# Patient Record
Sex: FEMALE | Race: WHITE | Employment: STUDENT | URBAN - METROPOLITAN AREA
[De-identification: names, ages, dates, MRNs, and addresses within clinical notes are randomized per-mention and may not be internally consistent; named-entity substitution may affect disease eponyms.]

---
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
## 2024-01-15 RX ORDER — DILTIAZEM HYDROCHLORIDE 60 MG/1
2 TABLET, FILM COATED ORAL 2 TIMES DAILY
Qty: 10.2 EACH | Refills: 0 | Status: SHIPPED | OUTPATIENT
Start: 2024-01-15

## 2024-01-15 NOTE — TELEPHONE ENCOUNTER
Last VISIT- 12-15-23 CS SOB    Last CPE- over a year ago    Last REFILL- 12-15-23    Last LABS- 12-15-23 cmp, cbc    Future Appointments   Date Time Provider Department Center   1/18/2024 10:30 AM Cesar Cui MD  [facility] [place]   1/18/2024  1:40 PM Saulo Simpson MD [facility] [address] [facility] [address]         Per PROTOCOL? No    Please Approve or Deny.

## 2024-01-18 ENCOUNTER — OFFICE VISIT (OUTPATIENT)
Dept: NEUROLOGY | Facility: CLINIC | Age: 19
End: 2024-01-18
Payer: COMMERCIAL

## 2024-01-18 ENCOUNTER — OFFICE VISIT (OUTPATIENT)
Facility: CLINIC | Age: 19
End: 2024-01-18
Payer: COMMERCIAL

## 2024-01-18 ENCOUNTER — TELEPHONE (OUTPATIENT)
Dept: INTERNAL MEDICINE CLINIC | Facility: CLINIC | Age: 19
End: 2024-01-18

## 2024-01-18 VITALS
DIASTOLIC BLOOD PRESSURE: 78 MMHG | BODY MASS INDEX: 18.96 KG/M2 | RESPIRATION RATE: 16 BRPM | OXYGEN SATURATION: 93 % | HEART RATE: 105 BPM | HEIGHT: 63 IN | WEIGHT: 107 LBS | SYSTOLIC BLOOD PRESSURE: 122 MMHG

## 2024-01-18 VITALS
DIASTOLIC BLOOD PRESSURE: 68 MMHG | SYSTOLIC BLOOD PRESSURE: 126 MMHG | RESPIRATION RATE: 16 BRPM | BODY MASS INDEX: 19 KG/M2 | HEART RATE: 107 BPM | WEIGHT: 107 LBS

## 2024-01-18 DIAGNOSIS — G43.709 CHRONIC MIGRAINE W/O AURA W/O STATUS MIGRAINOSUS, NOT INTRACTABLE: Primary | ICD-10-CM

## 2024-01-18 DIAGNOSIS — R91.8 MULTIPLE PULMONARY NODULES: ICD-10-CM

## 2024-01-18 DIAGNOSIS — R05.3 CHRONIC COUGH: ICD-10-CM

## 2024-01-18 DIAGNOSIS — R07.89 CHEST TIGHTNESS: ICD-10-CM

## 2024-01-18 DIAGNOSIS — R06.09 DOE (DYSPNEA ON EXERTION): Primary | ICD-10-CM

## 2024-01-18 PROCEDURE — 3078F DIAST BP <80 MM HG: CPT | Performed by: INTERNAL MEDICINE

## 2024-01-18 PROCEDURE — 99204 OFFICE O/P NEW MOD 45 MIN: CPT | Performed by: INTERNAL MEDICINE

## 2024-01-18 PROCEDURE — 3078F DIAST BP <80 MM HG: CPT | Performed by: OTHER

## 2024-01-18 PROCEDURE — 3074F SYST BP LT 130 MM HG: CPT | Performed by: INTERNAL MEDICINE

## 2024-01-18 PROCEDURE — 99215 OFFICE O/P EST HI 40 MIN: CPT | Performed by: OTHER

## 2024-01-18 PROCEDURE — 3074F SYST BP LT 130 MM HG: CPT | Performed by: OTHER

## 2024-01-18 PROCEDURE — 3008F BODY MASS INDEX DOCD: CPT | Performed by: INTERNAL MEDICINE

## 2024-01-18 RX ORDER — SULFAMETHOXAZOLE AND TRIMETHOPRIM 800; 160 MG/1; MG/1
1 TABLET ORAL 2 TIMES DAILY
COMMUNITY
Start: 2024-01-15 | End: 2024-01-22

## 2024-01-18 RX ORDER — PROPRANOLOL HYDROCHLORIDE 20 MG/1
20 TABLET ORAL DAILY
Qty: 30 TABLET | Refills: 3 | Status: SHIPPED | OUTPATIENT
Start: 2024-01-18

## 2024-01-18 RX ORDER — NITROFURANTOIN 25; 75 MG/1; MG/1
100 CAPSULE ORAL EVERY 12 HOURS
COMMUNITY
Start: 2023-12-31 | End: 2024-01-18

## 2024-01-18 RX ORDER — QUETIAPINE FUMARATE 50 MG/1
50 TABLET, FILM COATED ORAL NIGHTLY
COMMUNITY
Start: 2024-01-16

## 2024-01-18 RX ORDER — UBROGEPANT 50 MG/1
TABLET ORAL
Qty: 10 TABLET | Refills: 3 | Status: SHIPPED | OUTPATIENT
Start: 2024-01-18 | End: 2025-01-17

## 2024-01-18 NOTE — PATIENT INSTRUCTIONS
Obtain a breathing test (pulmonary function test and mannitol challenge) - Call central scheduling at [phone] to schedule the tests   I will plan to contact you after the tests are done  Call my office if you don't hear anything

## 2024-01-18 NOTE — PROGRESS NOTES
Pt states here for follow up on headaches and cerebral aneurysm. Pt states still having headaches and hasn't seen much improvement.

## 2024-01-18 NOTE — H&P
[facility] General Pulmonary Consult Note    History of Present Illness:  Nury Ordonez is a 18 year old female never smoker with significant PMH of childhood asthma who presents today for evaluation of dyspnea and chest pain. She reports having developed a virus around November 2023, tested negative for COVID/flu/RSV. Since that infection, she has had continued issues with nonproductive cough, dyspnea, chest pressure/tightness.  She had used albuterol MDI and neb with some transient relief, more recently was on symbicort x1 week with no improvement. She had a CT chest which noted tiny lung nodules.   She denies wheezing, f/c/s.  Does endorse ongoing evaluation with psychiatry for anxiety, possible bipolar disorder.    Past Medical History:   Past Medical History:   Diagnosis Date    Anxiety state     Depression     Migraines     Scoliosis 06/14/2019    Visual impairment         Past Surgical History: No past surgical history on file.    Family Medical History:   Family History   Problem Relation Age of Onset    Heart Disease Maternal Grandfather     High Blood Pressure Maternal Grandfather     Hypertension Maternal Grandfather     Anxiety Father     Other (Other) Father         UC    Anxiety Mother     Depression Mother     Anxiety Maternal Aunt     Bipolar Disorder Maternal Uncle     Bipolar Disorder Maternal Great-Grandmother     Cancer Paternal Grandmother         ovarian, omentum and liver        Social History:   Social History     Socioeconomic History    Marital status: Single     Spouse name: Not on file    Number of children: Not on file    Years of education: Not on file    Highest education level: Not on file   Occupational History    Not on file   Tobacco Use    Smoking status: Never    Smokeless tobacco: Never   Vaping Use    Vaping Use: Never used   Substance and Sexual Activity    Alcohol use: No    Drug use: Never    Sexual activity: Not on file   Other Topics Concern    Caffeine Concern No    Exercise  No    Seat Belt Not Asked    Special Diet Not Asked    Stress Concern Not Asked    Weight Concern Not Asked     Service Not Asked    Blood Transfusions Not Asked    Occupational Exposure Not Asked    Hobby Hazards Not Asked    Sleep Concern Not Asked    Back Care Not Asked    Bike Helmet Not Asked    Self-Exams Not Asked   Social History Narrative    Not on file     Social Determinants of Health     Financial Resource Strain: Not on file   Food Insecurity: Not on file   Transportation Needs: Not on file   Physical Activity: Not on file   Stress: Not on file   Social Connections: Not on file   Housing Stability: Not on file        Allergies: Red dye     Medications:   Current Outpatient Medications   Medication Sig Dispense Refill    QUEtiapine 50 MG Oral Tab Take 1 tablet (50 mg total) by mouth nightly.      sulfamethoxazole-trimethoprim -160 MG Oral Tab per tablet Take 1 tablet by mouth 2 (two) times daily.      albuterol (PROAIR HFA) 108 (90 Base) MCG/ACT Inhalation Aero Soln Inhale 2 puffs into the lungs every 4 (four) hours as needed for Shortness of Breath. 1 each 1    DULOXETINE 30 MG Oral Cap DR Particles TAKE 1 CAPSULE BY MOUTH EVERY DAY 90 capsule 0    SUMAtriptan 25 MG Oral Tab Take 1 tablet (25 mg total) by mouth every 2 (two) hours as needed for Migraine (no more than two tabs in 24 hours). 8 tablet 1    ubrogepant (UBRELVY) 50 MG Oral Tab Take one tablet at onset of migraine.  May take additional tablet in 2 hours if needed.  Do not exceed two tablets per 24 hour period. 10 tablet 0    Norgestimate-Eth Estradiol 0.25-35 MG-MCG Oral Tab Take 1 tablet by mouth daily. Wendy      ondansetron 4 MG Oral Tablet Dispersible Take 1 tablet (4 mg total) by mouth every 6 (six) hours as needed.      Multiple Vitamins-Minerals (MULTI-VITAMIN/MINERALS) Oral Tab Take 1 tablet by mouth daily.      traMADol 50 MG Oral Tab Take 0.5 tablets (25 mg total) by mouth 2 (two) times daily as needed for Pain. 8  tablet 0    levonorgestrel (KYLEENA) 19.5 MG Intrauterine IUD 19,500 mcg (1 each total) by Intrauterine route one time.      SYMBICORT 80-4.5 MCG/ACT Inhalation Aerosol INHALE 2 PUFFS INTO THE LUNGS 2 TIMES DAILY. RINSE MOUTH AFTER USE. (Patient not taking: Reported on 1/18/2024) 10.2 each 0    traZODone 50 MG Oral Tab Take 1 tablet (50 mg total) by mouth nightly. (Patient not taking: Reported on 1/18/2024) 90 tablet 0    methylPREDNISolone (MEDROL) 4 MG Oral Tablet Therapy Pack Take as directed (Patient not taking: Reported on 1/18/2024) 1 each 0    ALPRAZolam 0.5 MG Oral Tab Take 0.5 tablets (0.25 mg total) by mouth daily as needed for Sleep or Anxiety. (Patient not taking: Reported on 1/18/2024) 15 tablet 0    ALPRAZolam 0.25 MG Oral Tab Take 1 tablet (0.25 mg total) by mouth daily as needed. (Patient not taking: Reported on 1/18/2024) 30 tablet 0    HYDROcodone-acetaminophen 5-325 MG Oral Tab Take 1 tablet by mouth every 8 (eight) hours as needed for Pain. (Patient not taking: Reported on 1/18/2024) 9 tablet 0       Review of Systems: Review of Systems   Constitutional:  Positive for appetite change.   HENT: Negative.     Respiratory:  Positive for cough, chest tightness and shortness of breath. Negative for choking, wheezing and stridor.    Cardiovascular:  Positive for chest pain and palpitations.       Physical Exam:  /78 (BP Location: Left arm, Patient Position: Sitting, Cuff Size: adult)   Pulse 105   Resp 16   Ht 5' 3\" (1.6 m)   Wt 107 lb (48.5 kg)   LMP 08/20/2023 (Exact Date)   SpO2 93%   BMI 18.95 kg/m²      Constitutional: alert, cooperative. No acute distress.  HEENT: Head NC/AT.   Cardio: Regular rate and rhythm. Normal S1 and S2. No murmurs.   Respiratory: Thorax symmetrical with no labored breathing. Clear to ausculation bilaterally with symmetrical breath sounds. No wheezing, rhonchi, rales, or crackles.   GI: NABS. Abd soft, non-tender.  Extremities: No clubbing or cyanosis. No BLE  edema.    Neurologic: A&Ox3. No gross motor deficits.  Skin: Warm, dry  Psych: Calm, cooperative. Pleasant affect.    Results:  Personally reviewed  WBC: 7.2, done on 12/15/2023.  HGB: 13.7, done on 12/15/2023.  PLT: 252, done on 12/15/2023.     Glucose: 79, done on 12/15/2023.  Cr: 0.69, done on 12/15/2023.  CA: 8.6, done on 12/15/2023.  Na: 140, done on 12/15/2023.  K: 3.6, done on 12/15/2023.  Cl: 109, done on 12/15/2023.  CO2: 26, done on 12/15/2023.  Last ALB was 3.6% done on 12/15/2023.     CT CHEST (CPT=71250)    Result Date: 12/29/2023  CONCLUSION:  No focal consolidation is seen.  The lungs appear essentially clear.  There are tiny micro nodules likely present measuring up to 4 mm. According to Fleischner guidelines, solid nodules measuring less than 6 mm require no further followup in  low risk patients. In high-risk patients with a history of smoking or other risk factors, consider CT at 12 months.   LOCATION:  [id number]   Dictated by (CST): Chato Uribe MD on 12/29/2023 at 2:54 PM     Finalized by (CST): Chato Uribe MD on 12/29/2023 at 2:57 PM       MRI BRAIN MRA HEAD+MRA NECK (ALL W+WO) (CPT=70553/76993/35528)    Result Date: 12/28/2023  CONCLUSION:  Normal examination.   LOCATION:  [id number]   Dictated by (CST): Kumar Mejía MD on 12/28/2023 at 2:18 PM     Finalized by (CST): Kumar Mejía MD on 12/28/2023 at 2:28 PM       XR CHEST PA + LAT CHEST (CPT=71046)    Result Date: 12/15/2023  CONCLUSION:  No focal consolidation, but a reticular nodular pattern in the lung along with bronchial wall thickening may reflect atypical inflammatory disease or other interstitial lung disease process.  Consider high-resolution chest CT.  LOCATION:  [facility]   Dictated by (CST): Kumar Mejía MD on 12/15/2023 at 8:48 AM     Finalized by (CST): Kumar Mejía MD on 12/15/2023 at 8:50 AM         Assessment/Plan:  #1. dyspnea  Unclear etiology at this time. Main ddx between asthma vs anxiety vs other  ?hyperythyroidism  12/2023 CT chest essentially normal except for 2 and 4mm nodules which are unconcerning  Check PFTs/mannitol challenge  Continue on albuterol PRN; reports no improvement with symbicort 80 (but only for one week)  If negative PFTs, then consider cardiac workup vs further treatment with psychiatry    #2. Chronic cough  As above    #3. Chest tightness  As above    #4. Lung nodules  12/2023 CT chest essentially normal except for 2 and 4mm nodules which are unconcerning  No need for further testing per Fleischner guidelines.    Cesar Cui MD  1/18/2024

## 2024-01-18 NOTE — PROGRESS NOTES
[facility] Neurology Outpatient Progress Note  Date of service: 1/18/2024    Assessment:   Chronic migraine; not improving    Plan:  Reviewed  MRI BRAIN MRA HEAD+MRA NECK , negative  NO Imitrex due to chest pain  Ubrelvy prn  Propranolol started  Headache diary advised  Migraine and Medication overuse headache education given  PCP and Psychiatrist to follow re; anxiety  See orders and medications filed with this encounter. The patient indicates understanding of these issues and agrees with the plan.  Discussed with patient regarding assessment, work up, care plan and possible adverse and side effects of the medications.  RTC 6 months, needs update in 2 months other options; qulipta, amovig  Pt should go ER for any new or worsening symptoms and contact office for above tests' results, any    Subjective:   History:  Patient here for a follow-up visit for worsening daily headache. Since last visit headache has not improved, reports chest pain and is to see cardio.   Here to review test result and care plan, mother is here too  Per initial visit:  Nury Ordonez is a 18 year old female with past medical history as listed below presents here for initial evaluation of worsening headache, she states having headaches and migraines. Pt reports different type headaches, currently feels band around frontal area which is dfferent from typical migraine, her migraine starts in left eye, this persistent headache has started since early October and has not resolved, she tried and failed many OTC pain meds and norco, +family history of aneurysm, her grandfather had cerebral aneurysm.       History/Other:   REVIEW OF SYSTEMS:  A 10-point system was reviewed. Pertinent positives and negatives are noted as above       Current Outpatient Medications:     QUEtiapine 50 MG Oral Tab, Take 1 tablet (50 mg total) by mouth nightly., Disp: , Rfl:     sulfamethoxazole-trimethoprim -160 MG Oral Tab per tablet, Take 1 tablet by mouth 2 (two)  times daily., Disp: , Rfl:     propranolol 20 MG Oral Tab, Take 1 tablet (20 mg total) by mouth daily., Disp: 30 tablet, Rfl: 3    ubrogepant (UBRELVY) 50 MG Oral Tab, Take one tablet at onset of migraine.  May take additional tablet in 2 hours if needed.  Do not exceed two tablets per 24 hour period., Disp: 10 tablet, Rfl: 3    albuterol (PROAIR HFA) 108 (90 Base) MCG/ACT Inhalation Aero Soln, Inhale 2 puffs into the lungs every 4 (four) hours as needed for Shortness of Breath., Disp: 1 each, Rfl: 1    DULOXETINE 30 MG Oral Cap DR Particles, TAKE 1 CAPSULE BY MOUTH EVERY DAY, Disp: 90 capsule, Rfl: 0    Norgestimate-Eth Estradiol 0.25-35 MG-MCG Oral Tab, Take 1 tablet by mouth daily. Wendy, Disp: , Rfl:     ondansetron 4 MG Oral Tablet Dispersible, Take 1 tablet (4 mg total) by mouth every 6 (six) hours as needed., Disp: , Rfl:     Multiple Vitamins-Minerals (MULTI-VITAMIN/MINERALS) Oral Tab, Take 1 tablet by mouth daily., Disp: , Rfl:     traMADol 50 MG Oral Tab, Take 0.5 tablets (25 mg total) by mouth 2 (two) times daily as needed for Pain., Disp: 8 tablet, Rfl: 0    levonorgestrel (KYLEENA) 19.5 MG Intrauterine IUD, 19,500 mcg (1 each total) by Intrauterine route one time., Disp: , Rfl:     SYMBICORT 80-4.5 MCG/ACT Inhalation Aerosol, INHALE 2 PUFFS INTO THE LUNGS 2 TIMES DAILY. RINSE MOUTH AFTER USE. (Patient not taking: Reported on 1/18/2024), Disp: 10.2 each, Rfl: 0  Allergies:  Allergies   Allergen Reactions    Red Dye HIVES and NAUSEA ONLY     Past Medical History:   Diagnosis Date    Anxiety state     Depression     Migraines     Scoliosis 06/14/2019    Visual impairment      History reviewed. No pertinent surgical history.  Social History:  Social History     Tobacco Use    Smoking status: Never    Smokeless tobacco: Never   Substance Use Topics    Alcohol use: No     Family History   Problem Relation Age of Onset    Heart Disease Maternal Grandfather     High Blood Pressure Maternal Grandfather      Hypertension Maternal Grandfather     Anxiety Father     Other (Other) Father         UC    Anxiety Mother     Depression Mother     Anxiety Maternal Aunt     Bipolar Disorder Maternal Uncle     Bipolar Disorder Maternal Great-Grandmother     Cancer Paternal Grandmother         ovarian, omentum and liver       Objective:   Neurological Examination:  /68 (BP Location: Left arm, Patient Position: Sitting, Cuff Size: adult)   Pulse 107   Resp 16   Wt 107 lb (48.5 kg)   LMP 08/20/2023 (Exact Date)   BMI 18.95 kg/m²   Mental status: A & O X 3  Language: no aphasia  Speech: no dysarthria  CN II-XII: intact   Motor strength: 5/5 all extremities  Tone: normal  DTRs: 2+ symmetric  Coordination: normal  Sensory: symmetric  Gait: normal    Test reviewed on 1/18/2024      Saulo Simpson MD (Michael)  Neurology  [facility]  1/18/2024, 1:53 PM  CC: Basilio Abdalla MD

## 2024-01-18 NOTE — PATIENT INSTRUCTIONS
Refill policies:    Allow 2-3 business days for refills; controlled substances may take longer.  Contact your pharmacy at least 5 days prior to running out of medication and have them send an electronic request or submit request through the “request refill” option in your [employer] account.  Refills are not addressed on weekends; covering physicians do not authorize routine medications on weekends.  No narcotics or controlled substances are refilled after noon on Fridays or by on call physicians.  By law, narcotics must be electronically prescribed.  A 30 day supply with no refills is the maximum allowed.  If your prescription is due for a refill, you may be due for a follow up appointment.  To best provide you care, patients receiving routine medications need to be seen at least once a year.  Patients receiving narcotic/controlled substance medications need to be seen at least once every 3 months.  In the event that your preferred pharmacy does not have the requested medication in stock (e.g. Backordered), it is your responsibility to find another pharmacy that has the requested medication available.  We will gladly send a new prescription to that pharmacy at your request.    Scheduling Tests:    If your physician has ordered radiology tests such as MRI or CT scans, please contact Central Scheduling at [phone] right away to schedule the test.  Once scheduled, the [facility] Centralized Referral Team will work with your insurance carrier to obtain pre-certification or prior authorization.  Depending on your insurance carrier, approval may take 3-10 days.  It is highly recommended patients assure they have received an authorization before having a test performed.  If test is done without insurance authorization, patient may be responsible for the entire amount billed.      Precertification and Prior Authorizations:  If your physician has recommended that you have a procedure or additional testing performed the [facility]  Centralized Referral Team will contact your insurance carrier to obtain pre-certification or prior authorization.    You are strongly encouraged to contact your insurance carrier to verify that your procedure/test has been approved and is a COVERED benefit.  Although the [facility] Centralized Referral Team does its due diligence, the insurance carrier gives the disclaimer that \"Although the procedure is authorized, this does not guarantee payment.\"    Ultimately the patient is responsible for payment.   Thank you for your understanding in this matter.  Paperwork Completion:  If you require FMLA or disability paperwork for your recovery, please make sure to either drop it off or have it faxed to our office at [phone]. Be sure the form has your name and date of birth on it.  The form will be faxed to our Forms Department and they will complete it for you.  There is a 25$ fee for all forms that need to be filled out.  Please be aware there is a 10-14 day turnaround time.  You will need to sign a release of information (RACHAEL) form if your paperwork does not come with one.  You may call the Forms Department with any questions at [phone].  Their fax number is [phone].

## 2024-01-22 ENCOUNTER — TELEPHONE (OUTPATIENT)
Dept: INTERNAL MEDICINE CLINIC | Facility: CLINIC | Age: 19
End: 2024-01-22

## 2024-01-23 NOTE — TELEPHONE ENCOUNTER
Recommend pt call insurance for list of covered alternatives. Options include dulera, advair, breo, etc.

## 2024-02-01 ENCOUNTER — OFFICE VISIT (OUTPATIENT)
Dept: INTERNAL MEDICINE CLINIC | Facility: CLINIC | Age: 19
End: 2024-02-01
Payer: COMMERCIAL

## 2024-02-01 VITALS
WEIGHT: 111.63 LBS | HEIGHT: 64 IN | SYSTOLIC BLOOD PRESSURE: 102 MMHG | DIASTOLIC BLOOD PRESSURE: 64 MMHG | BODY MASS INDEX: 19.06 KG/M2 | RESPIRATION RATE: 16 BRPM | TEMPERATURE: 99 F | OXYGEN SATURATION: 99 % | HEART RATE: 103 BPM

## 2024-02-01 DIAGNOSIS — F41.0 PANIC ANXIETY SYNDROME: ICD-10-CM

## 2024-02-01 DIAGNOSIS — M25.50 DIFFUSE ARTHRALGIA: Primary | ICD-10-CM

## 2024-02-01 DIAGNOSIS — F31.60 BIPOLAR AFFECTIVE DISORDER, CURRENT EPISODE MIXED, CURRENT EPISODE SEVERITY UNSPECIFIED (HCC): ICD-10-CM

## 2024-02-01 DIAGNOSIS — R63.0 LOSS OF APPETITE: ICD-10-CM

## 2024-02-01 PROCEDURE — 3008F BODY MASS INDEX DOCD: CPT | Performed by: INTERNAL MEDICINE

## 2024-02-01 PROCEDURE — 99215 OFFICE O/P EST HI 40 MIN: CPT | Performed by: INTERNAL MEDICINE

## 2024-02-01 PROCEDURE — 3074F SYST BP LT 130 MM HG: CPT | Performed by: INTERNAL MEDICINE

## 2024-02-01 PROCEDURE — 3078F DIAST BP <80 MM HG: CPT | Performed by: INTERNAL MEDICINE

## 2024-02-01 RX ORDER — DIAZEPAM 5 MG/1
TABLET ORAL
COMMUNITY
Start: 2024-01-18

## 2024-02-01 RX ORDER — FAMOTIDINE 40 MG/1
40 TABLET, FILM COATED ORAL DAILY
Qty: 30 TABLET | Refills: 0 | Status: SHIPPED | OUTPATIENT
Start: 2024-02-01

## 2024-02-01 RX ORDER — GABAPENTIN 100 MG/1
100 CAPSULE ORAL NIGHTLY PRN
Qty: 30 CAPSULE | Refills: 0 | Status: SHIPPED | OUTPATIENT
Start: 2024-02-01

## 2024-02-01 NOTE — PROGRESS NOTES
Nury Ordonez  9/1/2005    Chief Complaint   Patient presents with    Follow - Up     Rm 7        SUBJECTIVE   Nury Ordonez is a 18 year old female who presents as a follow-up.    The patient recently underwent ED evaluation for abdominal pain and mood changes. Documentation of this encounter is not available at this time. She was managed for acute cystitis and underwent evaluation by the psychiatry service as an outpatient the following day. She was found to have bipolar disorder with fabian and depressive symptoms. She has since been following with the psychiatry service twice weekly and medical management has been updated, including tapering and ultimate discontinuation of cymbalta. Resultantly, her symptoms of diffuse arthralgia and myalgias returned. Furthermore, although mood significantly improved over the last two weeks, she reports a loss of appetite with minimal nausea. No significant abdominal pain. Yesterday her mood worsened, however, she ate a significant meal. Bowel movements has remained stable. She underwent TSH and lipid panel evaluation: TSH was WNL, and lipid panel revealed an elevated tg level, however, nonfasting.     Review of Systems   No f/c/chest pain or sob. No cough. No abd pain/n/v/d. No ha or dizziness. No numbness, tingling, or weakness. No other complaints today.    Current Outpatient Medications   Medication Sig Dispense Refill    diazePAM 5 MG Oral Tab TAKE ONE TAB BY MOUTH 3 TIMES A DAY AS NEEDED FOR ANXIETY OR INSOMNIA      gabapentin 100 MG Oral Cap Take 1 capsule (100 mg total) by mouth nightly as needed. 30 capsule 0    famotidine (PEPCID) 40 MG Oral Tab Take 1 tablet (40 mg total) by mouth daily. 30 tablet 0    QUEtiapine 50 MG Oral Tab Take 1 tablet (50 mg total) by mouth nightly. 50-75mg at night      propranolol 20 MG Oral Tab Take 1 tablet (20 mg total) by mouth daily. 30 tablet 3    ubrogepant (UBRELVY) 50 MG Oral Tab Take one tablet at onset of migraine.   May take additional tablet in 2 hours if needed.  Do not exceed two tablets per 24 hour period. 10 tablet 3    albuterol (PROAIR HFA) 108 (90 Base) MCG/ACT Inhalation Aero Soln Inhale 2 puffs into the lungs every 4 (four) hours as needed for Shortness of Breath. 1 each 1    Norgestimate-Eth Estradiol 0.25-35 MG-MCG Oral Tab Take 1 tablet by mouth daily. Wendy      ondansetron 4 MG Oral Tablet Dispersible Take 1 tablet (4 mg total) by mouth every 6 (six) hours as needed.      Multiple Vitamins-Minerals (MULTI-VITAMIN/MINERALS) Oral Tab Take 1 tablet by mouth daily.      levonorgestrel (KYLEENA) 19.5 MG Intrauterine IUD 19,500 mcg (1 each total) by Intrauterine route one time.      SYMBICORT 80-4.5 MCG/ACT Inhalation Aerosol INHALE 2 PUFFS INTO THE LUNGS 2 TIMES DAILY. RINSE MOUTH AFTER USE. (Patient not taking: Reported on 1/18/2024) 10.2 each 0    DULOXETINE 30 MG Oral Cap DR Particles TAKE 1 CAPSULE BY MOUTH EVERY DAY (Patient not taking: Reported on 2/1/2024) 90 capsule 0    traMADol 50 MG Oral Tab Take 0.5 tablets (25 mg total) by mouth 2 (two) times daily as needed for Pain. (Patient not taking: Reported on 2/1/2024) 8 tablet 0      Allergies   Allergen Reactions    Red Dye HIVES and NAUSEA ONLY      Past Medical History:   Diagnosis Date    Anxiety state     Depression     Migraines     Scoliosis 06/14/2019    Visual impairment       Patient Active Problem List   Diagnosis    DAO (generalized anxiety disorder)    Depression, unspecified depression type    Scoliosis    Chronic constipation    Menorrhagia with regular cycle    Chronic bilateral thoracic back pain    Panic anxiety syndrome    Dysmenorrhea    Chronic migraine w/o aura w/o status migrainosus, not intractable      History reviewed. No pertinent surgical history.   Social History     Socioeconomic History    Marital status: Single   Tobacco Use    Smoking status: Never    Smokeless tobacco: Never   Vaping Use    Vaping Use: Never used   Substance and  Sexual Activity    Alcohol use: No    Drug use: Never   Other Topics Concern    Caffeine Concern No    Exercise No         OBJECTIVE:   /64 (BP Location: Left arm, Patient Position: Sitting, Cuff Size: adult)   Pulse 103   Temp 98.8 °F (37.1 °C) (Skin)   Resp 16   Ht 5' 4\" (1.626 m)   Wt 111 lb 9.6 oz (50.6 kg)   LMP 08/20/2023 (Exact Date)   SpO2 99%   BMI 19.16 kg/m²   Constitutional: Oriented to person, place, and time. No distress.   HEENT:  Normocephalic and atraumatic.  Cardiovascular: Normal rate, regular rhythm and intact distal pulses.  No murmur, rubs or gallops.   Pulmonary/Chest: Effort normal and breath sounds normal. No respiratory distress.  Abdominal: Soft and nondistended. Epigastric tenderness without guarding or rebound.   Musculoskeletal: No edema  Skin: Skin is warm and dry. No rash.  Psychiatric: Normal mood and flat affect.     ASSESSMENT AND PLAN:   Nury Ordonez is a 18 year old female who presents as a follow-up.    Bipolar disorder and panic anxiety syndrome:  Varying symptoms of depression and fabian  Following with psychiatry service twice weekly  I do not believe her appetite changes are secondary to an anatomic or strictly physiologic etiology: lipase and CMP added. TSH WNL.  Chronic diffuse myalgias and arthralgias: refractory symptoms following discontinuation of cymbalta. Encouraged physical activity to manage symptoms of fibromyalgia. Gabapentin offered (reports red dye allergy is mild and insignificant). Autoimmune and rheumatologic panels ordered.     Total time spent: 45 minutes    The patient indicates understanding of these issues and agrees to the plan.  TODAY'S ORDERS     Orders Placed This Encounter   Procedures    Comp Metabolic Panel (14)    Lipase [E]    Rheumatoid Arthritis Factor    Uric Acid, Serum    Cyclic Citrullinate Peptide (CCP) antibodies    Sed Rate, Westergren (Automated)    C-Reactive Protein    Maday, Direct, Reflex To [facility]  (Cesar/[place])       Meds & Refills:  Requested Prescriptions     Signed Prescriptions Disp Refills    gabapentin 100 MG Oral Cap 30 capsule 0     Sig: Take 1 capsule (100 mg total) by mouth nightly as needed.    famotidine (PEPCID) 40 MG Oral Tab 30 tablet 0     Sig: Take 1 tablet (40 mg total) by mouth daily.       Imaging & Consults:  None    No follow-ups on file.  There are no Patient Instructions on file for this visit.    All questions were answered and the patient agrees with the plan.     Thank you,  Basilio Abdalla MD

## 2024-02-06 ENCOUNTER — LAB ENCOUNTER (OUTPATIENT)
Dept: LAB | Facility: HOSPITAL | Age: 19
End: 2024-02-06
Attending: INTERNAL MEDICINE
Payer: COMMERCIAL

## 2024-02-06 DIAGNOSIS — M25.50 DIFFUSE ARTHRALGIA: ICD-10-CM

## 2024-02-06 DIAGNOSIS — Z79.899 NEED FOR PROPHYLACTIC CHEMOTHERAPY: Primary | ICD-10-CM

## 2024-02-06 LAB
CHOLEST SERPL-MCNC: 174 MG/DL (ref ?–200)
ERYTHROCYTE [SEDIMENTATION RATE] IN BLOOD: 12 MM/HR
FASTING PATIENT LIPID ANSWER: YES
HDLC SERPL-MCNC: 61 MG/DL (ref 40–59)
LDLC SERPL CALC-MCNC: 84 MG/DL (ref ?–100)
NONHDLC SERPL-MCNC: 113 MG/DL (ref ?–130)
RHEUMATOID FACT SERPL-ACNC: <10 IU/ML (ref ?–15)
TRIGL SERPL-MCNC: 171 MG/DL (ref 30–149)
VLDLC SERPL CALC-MCNC: 27 MG/DL (ref 0–30)

## 2024-02-06 PROCEDURE — 86200 CCP ANTIBODY: CPT

## 2024-02-06 PROCEDURE — 85652 RBC SED RATE AUTOMATED: CPT

## 2024-02-06 PROCEDURE — 80061 LIPID PANEL: CPT

## 2024-02-06 PROCEDURE — 36415 COLL VENOUS BLD VENIPUNCTURE: CPT

## 2024-02-06 PROCEDURE — 86431 RHEUMATOID FACTOR QUANT: CPT

## 2024-02-07 ENCOUNTER — TELEPHONE (OUTPATIENT)
Dept: INTERNAL MEDICINE CLINIC | Facility: CLINIC | Age: 19
End: 2024-02-07

## 2024-02-07 LAB — CCP IGG SERPL-ACNC: 1.1 U/ML (ref 0–6.9)

## 2024-02-07 NOTE — TELEPHONE ENCOUNTER
Jolynn from [employer] - has a question on the below lab order.    Maday, Direct, Reflex To [facility] ([facility]) (Order [id number])

## 2024-02-07 NOTE — TELEPHONE ENCOUNTER
Spoke with gabriela from [facility]. She indicates patient at there site for testing, testing ordered to [facility] for send out.     [facility] does not have this test to include the 9 biomarkers and missing 4 of them. Advised patient will need to complete at [facility] given not available at [facility].     She will inform patient and have patient call us if any further questions, if not will pursue testing at [facility]. For daysi direct, relfex to [facility].

## 2024-02-13 ENCOUNTER — LAB ENCOUNTER (OUTPATIENT)
Dept: LAB | Facility: HOSPITAL | Age: 19
End: 2024-02-13
Attending: INTERNAL MEDICINE
Payer: COMMERCIAL

## 2024-02-13 DIAGNOSIS — M25.50 DIFFUSE ARTHRALGIA: Primary | ICD-10-CM

## 2024-02-13 PROCEDURE — 86038 ANTINUCLEAR ANTIBODIES: CPT

## 2024-02-13 PROCEDURE — 36415 COLL VENOUS BLD VENIPUNCTURE: CPT

## 2024-02-13 PROCEDURE — 86225 DNA ANTIBODY NATIVE: CPT

## 2024-02-14 LAB
DSDNA IGG SERPL IA-ACNC: 3.2 IU/ML
ENA AB SER QL IA: 0.3 UG/L
ENA AB SER QL IA: NEGATIVE

## 2024-02-21 ENCOUNTER — TELEPHONE (OUTPATIENT)
Facility: CLINIC | Age: 19
End: 2024-02-21

## 2024-02-29 RX ORDER — FAMOTIDINE 40 MG/1
40 TABLET, FILM COATED ORAL DAILY
Qty: 90 TABLET | Refills: 0 | Status: SHIPPED | OUTPATIENT
Start: 2024-02-29

## 2024-02-29 NOTE — TELEPHONE ENCOUNTER
famotidine (PEPCID) 40 MG Oral Tab         Sig: Take 1 tablet (40 mg total) by mouth daily.    Disp: 30 tablet    Refills: 0    Start: 2/29/2024    Class: Normal    Last ordered: 4 weeks ago (2/1/2024) by Basilio Abdalla MD    Gastrointestional Medication Protocol [id number]/29/2024 01:19 PM   Protocol Details In person appointment or virtual visit in the past 12 mos or appointment in next 3 mos      To be filled at: [facility]/pharmacy #[id number] [address] [phone], [phone]          #30 was sent on 2/1/24. AD, pt to continue? Pended if agreeable. Upcoming appt 3/11

## 2024-02-29 NOTE — TELEPHONE ENCOUNTER
famotidine (PEPCID) 40 MG Oral Tab         Sig: Take 1 tablet (40 mg total) by mouth daily.    Disp: 30 tablet    Refills: 0    Start: 2/29/2024    Class: Normal    Last ordered: 4 weeks ago (2/1/2024) by Basilio Abdalla MD    Gastrointestional Medication Protocol [id number]/29/2024 01:19 PM   Protocol Details In person appointment or virtual visit in the past 12 mos or appointment in next 3 mos      To be filled at: [facility]/pharmacy #[id number] - [address] [phone], [phone]          LOV 2/1/24. Upcoming appt 3/11/24.

## 2024-03-11 ENCOUNTER — OFFICE VISIT (OUTPATIENT)
Dept: INTERNAL MEDICINE CLINIC | Facility: CLINIC | Age: 19
End: 2024-03-11
Payer: COMMERCIAL

## 2024-03-11 VITALS
HEART RATE: 79 BPM | RESPIRATION RATE: 16 BRPM | OXYGEN SATURATION: 100 % | TEMPERATURE: 98 F | DIASTOLIC BLOOD PRESSURE: 68 MMHG | SYSTOLIC BLOOD PRESSURE: 102 MMHG

## 2024-03-11 DIAGNOSIS — Z00.00 ROUTINE GENERAL MEDICAL EXAMINATION AT A HEALTH CARE FACILITY: Primary | ICD-10-CM

## 2024-03-11 DIAGNOSIS — F41.0 PANIC ANXIETY SYNDROME: ICD-10-CM

## 2024-03-11 DIAGNOSIS — M41.25 OTHER IDIOPATHIC SCOLIOSIS, THORACOLUMBAR REGION: ICD-10-CM

## 2024-03-11 DIAGNOSIS — F32.A DEPRESSION, UNSPECIFIED DEPRESSION TYPE: ICD-10-CM

## 2024-03-11 DIAGNOSIS — F41.1 GAD (GENERALIZED ANXIETY DISORDER): ICD-10-CM

## 2024-03-11 PROBLEM — N92.0 MENORRHAGIA WITH REGULAR CYCLE: Status: RESOLVED | Noted: 2022-10-03 | Resolved: 2024-03-11

## 2024-03-11 PROBLEM — K59.09 CHRONIC CONSTIPATION: Status: RESOLVED | Noted: 2021-12-07 | Resolved: 2024-03-11

## 2024-03-11 PROBLEM — N94.6 DYSMENORRHEA: Status: RESOLVED | Noted: 2023-09-26 | Resolved: 2024-03-11

## 2024-03-11 PROBLEM — G43.709 CHRONIC MIGRAINE W/O AURA W/O STATUS MIGRAINOSUS, NOT INTRACTABLE: Status: RESOLVED | Noted: 2023-11-01 | Resolved: 2024-03-11

## 2024-03-11 PROBLEM — M54.6 CHRONIC BILATERAL THORACIC BACK PAIN: Status: RESOLVED | Noted: 2022-10-03 | Resolved: 2024-03-11

## 2024-03-11 PROBLEM — G89.29 CHRONIC BILATERAL THORACIC BACK PAIN: Status: RESOLVED | Noted: 2022-10-03 | Resolved: 2024-03-11

## 2024-03-11 PROCEDURE — 99395 PREV VISIT EST AGE 18-39: CPT | Performed by: INTERNAL MEDICINE

## 2024-03-11 PROCEDURE — 3078F DIAST BP <80 MM HG: CPT | Performed by: INTERNAL MEDICINE

## 2024-03-11 PROCEDURE — 3074F SYST BP LT 130 MM HG: CPT | Performed by: INTERNAL MEDICINE

## 2024-03-11 RX ORDER — LAMOTRIGINE 25 MG/1
25 TABLET ORAL 2 TIMES DAILY
COMMUNITY
Start: 2024-03-06

## 2024-03-11 RX ORDER — DEXTROAMPHETAMINE SACCHARATE, AMPHETAMINE ASPARTATE MONOHYDRATE, DEXTROAMPHETAMINE SULFATE AND AMPHETAMINE SULFATE 2.5; 2.5; 2.5; 2.5 MG/1; MG/1; MG/1; MG/1
10 CAPSULE, EXTENDED RELEASE ORAL EVERY MORNING
COMMUNITY
Start: 2024-02-26

## 2024-03-11 NOTE — PROGRESS NOTES
Nury Odronez  9/1/2005    Chief Complaint   Patient presents with    Follow - Up     Rm 7        HPI:   Nury Ordonez is a 18 year old female who presents for an annual physical examination.      Since last evaluation the patient reports significant improvement in symptoms of anxiety and depression, and difficulty with focusing suggestive of ADHD.  She has been following with an alternate psychiatrist, and there have been since several medication changes offering significant improvement.  She also reports improved symptoms of chronic pain secondary to resuming Cymbalta.  She denies any acute concerns at this time.    Current Outpatient Medications   Medication Sig Dispense Refill    lamoTRIgine 25 MG Oral Tab Take 1 tablet (25 mg total) by mouth 2 (two) times daily.      amphetamine-dextroamphetamine ER 10 MG Oral Capsule SR 24 Hr Take 1 capsule (10 mg total) by mouth every morning.      famotidine (PEPCID) 40 MG Oral Tab Take 1 tablet (40 mg total) by mouth daily. 90 tablet 0    diazePAM 5 MG Oral Tab TAKE ONE TAB BY MOUTH 3 TIMES A DAY AS NEEDED FOR ANXIETY OR INSOMNIA      propranolol 20 MG Oral Tab Take 1 tablet (20 mg total) by mouth daily. 30 tablet 3    ubrogepant (UBRELVY) 50 MG Oral Tab Take one tablet at onset of migraine.  May take additional tablet in 2 hours if needed.  Do not exceed two tablets per 24 hour period. 10 tablet 3    albuterol (PROAIR HFA) 108 (90 Base) MCG/ACT Inhalation Aero Soln Inhale 2 puffs into the lungs every 4 (four) hours as needed for Shortness of Breath. 1 each 1    DULOXETINE 30 MG Oral Cap DR Particles TAKE 1 CAPSULE BY MOUTH EVERY DAY 90 capsule 0    Norgestimate-Eth Estradiol 0.25-35 MG-MCG Oral Tab Take 1 tablet by mouth daily. Wendy      ondansetron 4 MG Oral Tablet Dispersible Take 1 tablet (4 mg total) by mouth every 6 (six) hours as needed.      Multiple Vitamins-Minerals (MULTI-VITAMIN/MINERALS) Oral Tab Take 1 tablet by mouth daily.      levonorgestrel  (KYLEENA) 19.5 MG Intrauterine IUD 19,500 mcg (1 each total) by Intrauterine route one time.      gabapentin 100 MG Oral Cap Take 1 capsule (100 mg total) by mouth nightly as needed. 30 capsule 0    QUEtiapine 50 MG Oral Tab Take 1 tablet (50 mg total) by mouth nightly. 50-75mg at night      SYMBICORT 80-4.5 MCG/ACT Inhalation Aerosol INHALE 2 PUFFS INTO THE LUNGS 2 TIMES DAILY. RINSE MOUTH AFTER USE. (Patient not taking: Reported on 1/18/2024) 10.2 each 0    traMADol 50 MG Oral Tab Take 0.5 tablets (25 mg total) by mouth 2 (two) times daily as needed for Pain. (Patient not taking: Reported on 2/1/2024) 8 tablet 0      Allergies   Allergen Reactions    Red Dye HIVES and NAUSEA ONLY      Past Medical History:   Diagnosis Date    Anxiety state     Depression     Migraines     Scoliosis 06/14/2019    Visual impairment       Patient Active Problem List   Diagnosis    DAO (generalized anxiety disorder)    Depression, unspecified depression type    Scoliosis    Chronic constipation    Menorrhagia with regular cycle    Chronic bilateral thoracic back pain    Panic anxiety syndrome    Dysmenorrhea    Chronic migraine w/o aura w/o status migrainosus, not intractable      No past surgical history on file.   Family History   Problem Relation Age of Onset    Heart Disease Maternal Grandfather     High Blood Pressure Maternal Grandfather     Hypertension Maternal Grandfather     Anxiety Father     Other (Other) Father         UC    Anxiety Mother     Depression Mother     Anxiety Maternal Aunt     Bipolar Disorder Maternal Uncle     Bipolar Disorder Maternal Great-Grandmother     Cancer Paternal Grandmother         ovarian, omentum and liver      Social History     Socioeconomic History    Marital status: Single   Tobacco Use    Smoking status: Never    Smokeless tobacco: Never   Vaping Use    Vaping Use: Never used   Substance and Sexual Activity    Alcohol use: No    Drug use: Never   Other Topics Concern    Caffeine Concern  No    Exercise No         REVIEW OF SYSTEMS:   GENERAL: feels well otherwise  SKIN: no rashes  EYES:denies blurred vision or double vision  HEENT: Not congested  LUNGS: denies shortness of breath with exertion  CARDIOVASCULAR: denies chest pain on exertion  GI: no nausea or abdominal pain  NEURO: denies headaches    EXAM:   /68   Pulse 79   Temp 98.2 °F (36.8 °C) (Skin)   Resp 16   LMP 08/20/2023 (Exact Date)   SpO2 100%   GENERAL: Well developed, well nourished,in no apparent distress  SKIN: No rashes,no suspicious lesions  EYES: Bilateral conjunctiva are clear  HEENT: atraumatic, normocephalic. TM WNL BL.  NECK: supple,no adenopathy,no bruits  LUNGS: clear to auscultation  CARDIO: RRR without murmur  GI: good BS's,no masses, HSM or tenderness    ASSESSMENT AND PLAN:   Nury Ordonez is a 18 year old female who presents for an annual physical examination.    Outstanding screening and preventive measures:  None    Anxiety and depression:  Panic anxiety syndrome and generalized anxiety disorder  Improved control following medication changes associated chronic pain syndrome suggestive of fibromyalgia    Scoliosis of thoracolumbar region:  Improved symptoms with improving frequency and duration of ambulation, and Cymbalta use    The patient indicates understanding of these issues and agrees to the plan.  TODAY'S ORDERS     No orders of the defined types were placed in this encounter.      Meds & Refills:  Requested Prescriptions      No prescriptions requested or ordered in this encounter       Imaging & Consults:  None    No follow-ups on file.  There are no Patient Instructions on file for this visit.    All questions were answered and the patient agrees with the plan.     Thank you,  Basilio Abdalla MD

## 2024-04-14 DIAGNOSIS — G43.709 CHRONIC MIGRAINE W/O AURA W/O STATUS MIGRAINOSUS, NOT INTRACTABLE: Primary | ICD-10-CM

## 2024-04-15 RX ORDER — PROPRANOLOL HYDROCHLORIDE 20 MG/1
20 TABLET ORAL DAILY
Qty: 90 TABLET | Refills: 0 | Status: SHIPPED | OUTPATIENT
Start: 2024-04-15

## 2024-04-15 NOTE — TELEPHONE ENCOUNTER
Medication: PROPRANOLOL 20 MG Oral Tab      Date of last refill: 01/18/2024 (#30/3)  [place]     Last office visit: 01/18/2024  Due back to clinic per last office note:  not stated  Date next office visit scheduled:    No future appointments.        Last OV note recommendation:    Plan:  Reviewed  MRI BRAIN MRA HEAD+MRA NECK , negative  NO Imitrex due to chest pain  Ubrelvy prn  Propranolol started  Headache diary advised  Migraine and Medication overuse headache education given  PCP and Psychiatrist to follow re; anxiety  See orders and medications filed with this encounter. The patient indicates understanding of these issues and agrees with the plan.  Discussed with patient regarding assessment, work up, care plan and possible adverse and side effects of the medications.  RTC 6 months, needs update in 2 months other options; qulipta, amovig  Pt should go ER for any new or worsening symptoms and contact office for above tests' results, any

## 2024-05-28 ENCOUNTER — RT VISIT (OUTPATIENT)
Dept: RESPIRATORY THERAPY | Facility: HOSPITAL | Age: 19
End: 2024-05-28
Attending: INTERNAL MEDICINE

## 2024-05-28 DIAGNOSIS — R06.09 DOE (DYSPNEA ON EXERTION): ICD-10-CM

## 2024-05-30 PROCEDURE — 94070 EVALUATION OF WHEEZING: CPT | Performed by: INTERNAL MEDICINE

## 2024-05-30 NOTE — PROCEDURES
Spirometry findings:  FEV1 is 3.39L, z-score of 1.00.  FVC is 3.42L, z-score of 0.15.  FEV1/ FVC ratio is 0.99.  The flow-volume loop is normal.   Bronchoprovocation testing:  Bronchoprovocation was performed using mannitol in escalating doses. There was a decrease in FEV1 of over 10% in between maneuvers, which is considered significant. After completion of the bronchoprovocation challenge, albuterol was administered with improvement of spirometry to baseline.     Impression:  Baseline normal spirometry with no evidence obstruction on spirometry and visualized flow-volume loop.  Mannitol bronchoprovocation challenge test is positive for bronchial hyperresponsiveness. This can be seen with asthma, allergic rhinitis and with smokers so clinical correlation is required.

## 2024-06-10 RX ORDER — FAMOTIDINE 40 MG/1
40 TABLET, FILM COATED ORAL DAILY
Qty: 90 TABLET | Refills: 3 | Status: SHIPPED | OUTPATIENT
Start: 2024-06-10

## 2024-06-10 NOTE — TELEPHONE ENCOUNTER
Please review pended refill request as unable to refill due to high/very high drug interaction warning copied here:      High  High Dose: famotidine, 40 mg, Oral, DailySingle dose of 40 mg exceeds recommended maximum of 20 mg by 100%    Refill passed per protocol.    Requested Prescriptions   Pending Prescriptions Disp Refills    FAMOTIDINE 40 MG Oral Tab [Pharmacy Med Name: FAMOTIDINE 40 MG TABLET] 90 tablet 0     Sig: TAKE 1 TABLET BY MOUTH EVERY DAY       Gastrointestional Medication Protocol Passed - 6/6/2024 12:45 AM        Passed - In person appointment or virtual visit in the past 12 mos or appointment in next 3 mos     Recent Outpatient Visits              3 months ago Routine general medical examination at a health care facility    [facility], [address] [place] [address] Basilio Ojeda MD    Office Visit    4 months ago Diffuse arthralgia    [facility], [address] [place] [address] Basilio Ojeda MD    Office Visit    4 months ago HERNANDEZ (dyspnea on exertion)    [facility] Cesar Thomas MD    Office Visit    4 months ago Chronic migraine w/o aura w/o status migrainosus, not intractable    [facility], [address] [place] [address], Saulo Mora MD    Office Visit    5 months ago SOB (shortness of breath)    [facility], [address] [place] [address] Nhi Garcia PA-C    Office Visit                             Recent Outpatient Visits              3 months ago Routine general medical examination at a health care facility    [facility], [address] [place] [address] Basilio Ojeda MD    Office Visit    4 months ago Diffuse arthralgia    [address] Basilio Ojeda MD    Office Visit    4 months ago HERNANDEZ (dyspnea on exertion)    [facility] Cesar Thomas MD    Office Visit    4 months ago Chronic  migraine w/o aura w/o status migrainosus, not intractable    [facility], [address] [place] [address] Saulo Simpson MD    Office Visit    5 months ago SOB (shortness of breath)    [facility], [address], Nhi Garcia PA-C    Office Visit

## 2024-07-03 ENCOUNTER — TELEPHONE (OUTPATIENT)
Dept: OBGYN UNIT | Facility: HOSPITAL | Age: 19
End: 2024-07-03

## 2024-07-03 DIAGNOSIS — N80.9 ENDOMETRIOSIS: Primary | ICD-10-CM

## 2024-07-03 RX ORDER — HYDROCODONE BITARTRATE AND ACETAMINOPHEN 5; 325 MG/1; MG/1
1-2 TABLET ORAL EVERY 6 HOURS PRN
Qty: 12 TABLET | Refills: 0 | Status: SHIPPED | OUTPATIENT
Start: 2024-07-03

## 2024-07-18 DIAGNOSIS — G43.709 CHRONIC MIGRAINE W/O AURA W/O STATUS MIGRAINOSUS, NOT INTRACTABLE: ICD-10-CM

## 2024-07-18 RX ORDER — PROPRANOLOL HYDROCHLORIDE 20 MG/1
20 TABLET ORAL DAILY
Qty: 90 TABLET | Refills: 0 | Status: SHIPPED | OUTPATIENT
Start: 2024-07-18

## 2024-07-18 NOTE — TELEPHONE ENCOUNTER
Medication: PROPRANOLOL 20 MG Oral Tab      Date of last refill: 04/15/2024 (#90/0)  [place]     Last office visit: 1/18/2024  Due back to clinic per last office note:  6 months   Date next office visit scheduled:    Future Appointments   Date Time Provider Department Center   8/20/2024  3:20 PM Cesar Cui MD  [facility]           Last OV note recommendation:    Assessment:   Chronic migraine; not improving     Plan:  Reviewed  MRI BRAIN MRA HEAD+MRA NECK , negative  NO Imitrex due to chest pain  Ubrelvy prn  Propranolol started  Headache diary advised  Migraine and Medication overuse headache education given  PCP and Psychiatrist to follow re; anxiety  See orders and medications filed with this encounter. The patient indicates understanding of these issues and agrees with the plan.  Discussed with patient regarding assessment, work up, care plan and possible adverse and side effects of the medications.  RTC 6 months, needs update in 2 months other options; qulipta, amovig  Pt should go ER for any new or worsening symptoms and contact office for above tests' results, any

## 2024-08-20 ENCOUNTER — OFFICE VISIT (OUTPATIENT)
Facility: CLINIC | Age: 19
End: 2024-08-20
Payer: COMMERCIAL

## 2024-08-20 VITALS
BODY MASS INDEX: 19.84 KG/M2 | HEART RATE: 93 BPM | SYSTOLIC BLOOD PRESSURE: 100 MMHG | HEIGHT: 63 IN | RESPIRATION RATE: 16 BRPM | WEIGHT: 112 LBS | OXYGEN SATURATION: 99 % | DIASTOLIC BLOOD PRESSURE: 60 MMHG

## 2024-08-20 DIAGNOSIS — J45.20 MILD INTERMITTENT ASTHMA WITHOUT COMPLICATION (HCC): Primary | ICD-10-CM

## 2024-08-20 PROCEDURE — 3078F DIAST BP <80 MM HG: CPT | Performed by: INTERNAL MEDICINE

## 2024-08-20 PROCEDURE — 3074F SYST BP LT 130 MM HG: CPT | Performed by: INTERNAL MEDICINE

## 2024-08-20 PROCEDURE — 3008F BODY MASS INDEX DOCD: CPT | Performed by: INTERNAL MEDICINE

## 2024-08-20 PROCEDURE — 99213 OFFICE O/P EST LOW 20 MIN: CPT | Performed by: INTERNAL MEDICINE

## 2024-08-20 RX ORDER — LEVONORGESTREL 52 MG/1
1 INTRAUTERINE DEVICE INTRAUTERINE ONCE
COMMUNITY

## 2024-08-20 RX ORDER — ELAGOLIX 150 MG/1
TABLET, FILM COATED ORAL
COMMUNITY
Start: 2024-08-06

## 2024-08-20 RX ORDER — DULOXETIN HYDROCHLORIDE 60 MG/1
60 CAPSULE, DELAYED RELEASE ORAL DAILY
COMMUNITY
Start: 2024-07-24

## 2024-08-20 RX ORDER — LORATADINE 10 MG/1
10 TABLET ORAL DAILY
COMMUNITY

## 2024-08-20 RX ORDER — ALBUTEROL SULFATE 90 UG/1
2 AEROSOL, METERED RESPIRATORY (INHALATION) EVERY 6 HOURS PRN
Qty: 3 EACH | Refills: 3 | Status: SHIPPED | OUTPATIENT
Start: 2024-08-20

## 2024-08-20 RX ORDER — TRAZODONE HYDROCHLORIDE 100 MG/1
100 TABLET ORAL NIGHTLY
COMMUNITY
Start: 2024-05-31

## 2024-08-20 NOTE — PROGRESS NOTES
[facility] General Pulmonary Progress Note    History of Present Illness:  Nury Ordonez is a 18 year old female never smoker with significant PMH of childhood asthma who presents today for follow up of asthma. Since last visit she has been well. Using albuterol PRN - maybe once a week. No cough, dyspnea at present. No pain    Jan 2024 previously  She reports having developed a virus around November 2023, tested negative for COVID/flu/RSV. Since that infection, she has had continued issues with nonproductive cough, dyspnea, chest pressure/tightness.  She had used albuterol MDI and neb with some transient relief, more recently was on symbicort x1 week with no improvement. She had a CT chest which noted tiny lung nodules.   She denies wheezing, f/c/s.  Does endorse ongoing evaluation with psychiatry for anxiety, possible bipolar disorder.      Past Medical History:   Past Medical History:    Anxiety state    Depression    Migraines    Scoliosis    Visual impairment        Past Surgical History: History reviewed. No pertinent surgical history.    Family Medical History:   Family History   Problem Relation Age of Onset    Heart Disease Maternal Grandfather     High Blood Pressure Maternal Grandfather     Hypertension Maternal Grandfather     Anxiety Father     Other (Other) Father         UC    Anxiety Mother     Depression Mother     Anxiety Maternal Aunt     Bipolar Disorder Maternal Uncle     Bipolar Disorder Maternal Great-Grandmother     Cancer Paternal Grandmother         ovarian, omentum and liver        Social History:   Social History     Socioeconomic History    Marital status: Single     Spouse name: Not on file    Number of children: Not on file    Years of education: Not on file    Highest education level: Not on file   Occupational History    Not on file   Tobacco Use    Smoking status: Never    Smokeless tobacco: Never   Vaping Use    Vaping status: Never Used   Substance and Sexual Activity    Alcohol use: No     Drug use: Never    Sexual activity: Not on file   Other Topics Concern    Caffeine Concern No    Exercise No    Seat Belt Not Asked    Special Diet Not Asked    Stress Concern Not Asked    Weight Concern Not Asked     Service Not Asked    Blood Transfusions Not Asked    Occupational Exposure Not Asked    Hobby Hazards Not Asked    Sleep Concern Not Asked    Back Care Not Asked    Bike Helmet Not Asked    Self-Exams Not Asked   Social History Narrative    Not on file     Social Determinants of Health     Financial Resource Strain: Not on file   Food Insecurity: Not on file   Transportation Needs: Not on file   Physical Activity: Not on file   Stress: Not on file   Social Connections: Not on file   Housing Stability: Not on file        Medications:   Current Outpatient Medications   Medication Sig Dispense Refill    ORILISSA 150 MG Oral Tab TAKE 150 MG BY MOUTH DAILY FOR 30 DAYS.      Ferrous Sulfate (IRON OR) Take by mouth daily.      loratadine 10 MG Oral Tab Take 1 tablet (10 mg total) by mouth daily.      traZODone 100 MG Oral Tab Take 1 tablet (100 mg total) by mouth nightly.      DULoxetine 60 MG Oral Cap DR Particles Take 1 capsule (60 mg total) by mouth daily.      Levonorgestrel (LILETTA, 52 MG,) 20.1 MCG/DAY Intrauterine IUD 1 each by Intrauterine route one time.      albuterol 108 (90 Base) MCG/ACT Inhalation Aero Soln Inhale 2 puffs into the lungs every 6 (six) hours as needed for Wheezing. inhale 2 puff by inhalation route  every 4 - 6 hours as needed 3 each 3    PROPRANOLOL 20 MG Oral Tab TAKE 1 TABLET BY MOUTH EVERY DAY 90 tablet 0    HYDROcodone-acetaminophen 5-325 MG Oral Tab Take 1-2 tablets by mouth every 6 (six) hours as needed. 12 tablet 0    famotidine 40 MG Oral Tab Take 1 tablet (40 mg total) by mouth daily. 90 tablet 3    lamoTRIgine 100 MG Oral Tab Take 25 mg by mouth daily.      amphetamine-dextroamphetamine ER 10 MG Oral Capsule SR 24 Hr Take 1 capsule (10 mg total) by mouth  every morning.      diazePAM 5 MG Oral Tab TAKE ONE TAB BY MOUTH 3 TIMES A DAY AS NEEDED FOR ANXIETY OR INSOMNIA      ubrogepant (UBRELVY) 50 MG Oral Tab Take one tablet at onset of migraine.  May take additional tablet in 2 hours if needed.  Do not exceed two tablets per 24 hour period. 10 tablet 3    albuterol (PROAIR HFA) 108 (90 Base) MCG/ACT Inhalation Aero Soln Inhale 2 puffs into the lungs every 4 (four) hours as needed for Shortness of Breath. 1 each 1    DULOXETINE 30 MG Oral Cap DR Particles TAKE 1 CAPSULE BY MOUTH EVERY DAY 90 capsule 0    Norgestimate-Eth Estradiol 0.25-35 MG-MCG Oral Tab Take 1 tablet by mouth daily. Wendy      ondansetron 4 MG Oral Tablet Dispersible Take 1 tablet (4 mg total) by mouth every 6 (six) hours as needed.      Multiple Vitamins-Minerals (MULTI-VITAMIN/MINERALS) Oral Tab Take 1 tablet by mouth daily.      gabapentin 100 MG Oral Cap Take 1 capsule (100 mg total) by mouth nightly as needed. 30 capsule 0    QUEtiapine 50 MG Oral Tab Take 1 tablet (50 mg total) by mouth nightly. 50-75mg at night      SYMBICORT 80-4.5 MCG/ACT Inhalation Aerosol INHALE 2 PUFFS INTO THE LUNGS 2 TIMES DAILY. RINSE MOUTH AFTER USE. (Patient not taking: Reported on 1/18/2024) 10.2 each 0    traMADol 50 MG Oral Tab Take 0.5 tablets (25 mg total) by mouth 2 (two) times daily as needed for Pain. (Patient not taking: Reported on 2/1/2024) 8 tablet 0    levonorgestrel (KYLEENA) 19.5 MG Intrauterine IUD 19,500 mcg (1 each total) by Intrauterine route one time. (Patient not taking: Reported on 8/20/2024)         Review of Systems: Review of Systems   Constitutional: Negative.    HENT: Negative.     Respiratory: Negative.     Cardiovascular: Negative.    All other systems reviewed and are negative.       Physical Exam:  /60 (BP Location: Left arm, Patient Position: Sitting, Cuff Size: adult)   Pulse 93   Resp 16   Ht 5' 3\" (1.6 m)   Wt 112 lb (50.8 kg)   LMP 08/20/2023 (Exact Date)   SpO2 99%   BMI  19.84 kg/m²      Constitutional: alert, cooperative. No acute distress.  HEENT: Head NC/AT.    Cardio: Regular rate and rhythm. Normal S1 and S2. No murmurs.   Respiratory: Thorax symmetrical with no labored breathing. Clear to ausculation bilaterally with symmetrical breath sounds. No wheezing, rhonchi, rales, or crackles.   GI: NABS. Abd soft, non-tender.  Extremities: No clubbing or cyanosis. No BLE edema.    Neurologic: A&Ox3. No gross motor deficits.  Skin: Warm, dry  Psych: Calm, cooperative. Pleasant affect.    Results:  Personally reviewed    WBC: 7.2, done on 12/15/2023.  HGB: 13.7, done on 12/15/2023.  PLT: 252, done on 12/15/2023.     Glucose: 102, done on 1/31/2024.  Cr: 0.59, done on 1/31/2024.  Last eGFR was 133 on 1/31/2024.  CA: 8.6, done on 1/31/2024.  Na: 143, done on 1/31/2024.  K: 3.9, done on 1/31/2024.  Cl: 111, done on 1/31/2024.  CO2: 26, done on 1/31/2024.  Last ALB was 3.4% done on 1/31/2024.     No results found.     Assessment/Plan:  #1. asthma  Mild, intermittent  Controlled/stable  Continue on albuterol PRN; no relief in past with symbicort 80.  If worsening sx, then would resume ICS/LABA    #2. Chronic cough  As above    #3. Chest tightness  As above    #4. Lung nodules  12/2023 CT chest essentially normal except for 2 and 4mm nodules which are unconcerning  No need for further testing per Fleischner guidelines.      Cesar Cui MD  8/20/2024

## 2024-08-20 NOTE — PATIENT INSTRUCTIONS
Continue to use albuterol 2 puffs every 6 hours as needed for your breathing or cough  Let me know if your breathing changes or other concerns  Call/message with questions/concerns

## 2024-09-24 ENCOUNTER — HOSPITAL ENCOUNTER (EMERGENCY)
Facility: HOSPITAL | Age: 19
Discharge: HOME | End: 2024-09-24
Attending: EMERGENCY MEDICINE
Payer: COMMERCIAL

## 2024-09-24 VITALS
SYSTOLIC BLOOD PRESSURE: 138 MMHG | RESPIRATION RATE: 22 BRPM | TEMPERATURE: 98 F | DIASTOLIC BLOOD PRESSURE: 71 MMHG | OXYGEN SATURATION: 100 % | WEIGHT: 110 LBS | HEART RATE: 99 BPM | BODY MASS INDEX: 19.49 KG/M2 | HEIGHT: 63 IN

## 2024-09-24 DIAGNOSIS — R53.83 FATIGUE, UNSPECIFIED TYPE: Primary | ICD-10-CM

## 2024-09-24 LAB
ALBUMIN SERPL-MCNC: 5.2 G/DL (ref 3.2–4.8)
ALBUMIN/GLOB SERPL: 2.4 {RATIO} (ref 1–2)
ALP LIVER SERPL-CCNC: 67 U/L
ALT SERPL-CCNC: 12 U/L
ANION GAP SERPL CALC-SCNC: 9 MMOL/L (ref 0–18)
AST SERPL-CCNC: 19 U/L (ref ?–34)
B-HCG UR QL: NEGATIVE
BASOPHILS # BLD AUTO: 0.05 X10(3) UL (ref 0–0.2)
BASOPHILS NFR BLD AUTO: 0.5 %
BILIRUB SERPL-MCNC: 0.6 MG/DL (ref 0.3–1.2)
BILIRUB UR QL STRIP.AUTO: NEGATIVE
BUN BLD-MCNC: 8 MG/DL (ref 9–23)
CALCIUM BLD-MCNC: 9.9 MG/DL (ref 8.7–10.4)
CHLORIDE SERPL-SCNC: 107 MMOL/L (ref 98–112)
CO2 SERPL-SCNC: 26 MMOL/L (ref 21–32)
COLOR UR AUTO: YELLOW
CREAT BLD-MCNC: 0.74 MG/DL
EGFRCR SERPLBLD CKD-EPI 2021: 119 ML/MIN/1.73M2 (ref 60–?)
EOSINOPHIL # BLD AUTO: 0.35 X10(3) UL (ref 0–0.7)
EOSINOPHIL NFR BLD AUTO: 3.6 %
ERYTHROCYTE [DISTWIDTH] IN BLOOD BY AUTOMATED COUNT: 12.4 %
FLUAV + FLUBV RNA SPEC NAA+PROBE: NEGATIVE
FLUAV + FLUBV RNA SPEC NAA+PROBE: NEGATIVE
GLOBULIN PLAS-MCNC: 2.2 G/DL (ref 2–3.5)
GLUCOSE BLD-MCNC: 105 MG/DL (ref 70–99)
GLUCOSE UR STRIP.AUTO-MCNC: NORMAL MG/DL
HCT VFR BLD AUTO: 40.1 %
HETEROPH AB SER QL: NEGATIVE
HGB BLD-MCNC: 14.4 G/DL
HYALINE CASTS #/AREA URNS AUTO: PRESENT /LPF
IMM GRANULOCYTES # BLD AUTO: 0.04 X10(3) UL (ref 0–1)
IMM GRANULOCYTES NFR BLD: 0.4 %
KETONES UR STRIP.AUTO-MCNC: 10 MG/DL
LEUKOCYTE ESTERASE UR QL STRIP.AUTO: NEGATIVE
LYMPHOCYTES # BLD AUTO: 2.68 X10(3) UL (ref 1–4)
LYMPHOCYTES NFR BLD AUTO: 27.2 %
MCH RBC QN AUTO: 31.2 PG (ref 26–34)
MCHC RBC AUTO-ENTMCNC: 35.9 G/DL (ref 31–37)
MCV RBC AUTO: 87 FL
MONOCYTES # BLD AUTO: 0.53 X10(3) UL (ref 0.1–1)
MONOCYTES NFR BLD AUTO: 5.4 %
NEUTROPHILS # BLD AUTO: 6.2 X10 (3) UL (ref 1.5–7.7)
NEUTROPHILS # BLD AUTO: 6.2 X10(3) UL (ref 1.5–7.7)
NEUTROPHILS NFR BLD AUTO: 62.9 %
NITRITE UR QL STRIP.AUTO: NEGATIVE
OSMOLALITY SERPL CALC.SUM OF ELEC: 293 MOSM/KG (ref 275–295)
PH UR STRIP.AUTO: 5.5 [PH] (ref 5–8)
PLATELET # BLD AUTO: 293 10(3)UL (ref 150–450)
POTASSIUM SERPL-SCNC: 3.5 MMOL/L (ref 3.5–5.1)
PROT SERPL-MCNC: 7.4 G/DL (ref 5.7–8.2)
PROT UR STRIP.AUTO-MCNC: 20 MG/DL
RBC # BLD AUTO: 4.61 X10(6)UL
RBC UR QL AUTO: NEGATIVE
RSV RNA SPEC NAA+PROBE: NEGATIVE
SARS-COV-2 RNA RESP QL NAA+PROBE: NOT DETECTED
SODIUM SERPL-SCNC: 142 MMOL/L (ref 136–145)
SP GR UR STRIP.AUTO: >1.03 (ref 1–1.03)
T4 FREE SERPL-MCNC: 1.3 NG/DL (ref 0.8–1.7)
TSI SER-ACNC: 3.49 MIU/ML (ref 0.55–4.78)
UROBILINOGEN UR STRIP.AUTO-MCNC: NORMAL MG/DL
WBC # BLD AUTO: 9.9 X10(3) UL (ref 4–11)

## 2024-09-24 PROCEDURE — 86665 EPSTEIN-BARR CAPSID VCA: CPT | Performed by: EMERGENCY MEDICINE

## 2024-09-24 PROCEDURE — 80053 COMPREHEN METABOLIC PANEL: CPT | Performed by: EMERGENCY MEDICINE

## 2024-09-24 PROCEDURE — 99284 EMERGENCY DEPT VISIT MOD MDM: CPT

## 2024-09-24 PROCEDURE — 84439 ASSAY OF FREE THYROXINE: CPT | Performed by: EMERGENCY MEDICINE

## 2024-09-24 PROCEDURE — 85025 COMPLETE CBC W/AUTO DIFF WBC: CPT | Performed by: EMERGENCY MEDICINE

## 2024-09-24 PROCEDURE — 81001 URINALYSIS AUTO W/SCOPE: CPT | Performed by: EMERGENCY MEDICINE

## 2024-09-24 PROCEDURE — 86664 EPSTEIN-BARR NUCLEAR ANTIGEN: CPT | Performed by: EMERGENCY MEDICINE

## 2024-09-24 PROCEDURE — 0241U SARS-COV-2/FLU A AND B/RSV BY PCR (GENEXPERT): CPT | Performed by: EMERGENCY MEDICINE

## 2024-09-24 PROCEDURE — 96360 HYDRATION IV INFUSION INIT: CPT

## 2024-09-24 PROCEDURE — 86403 PARTICLE AGGLUT ANTBDY SCRN: CPT | Performed by: EMERGENCY MEDICINE

## 2024-09-24 PROCEDURE — 84443 ASSAY THYROID STIM HORMONE: CPT | Performed by: EMERGENCY MEDICINE

## 2024-09-24 PROCEDURE — 81025 URINE PREGNANCY TEST: CPT

## 2024-09-24 NOTE — ED PROVIDER NOTES
Patient Seen in: [facility] Emergency Department      History     Chief Complaint   Patient presents with    Fatigue     Stated Complaint: FATIGUE    Subjective:   HPI  Patient is a 19-year-old  Who says that she has had fatigue has been getting worse over the last 2 weeks.  Said that she is also has been eating more and drinking more than normal.  She also has been urinating more than normal.  Patient denies dysuria.  Patient denies fever.  Patient denies runny nose, cough, sore throat, or earache.  Patient denies chest or abdominal pain.      Objective:   Past Medical History:    Anxiety state    Depression    Fibromyalgia    Migraines    Scoliosis    Visual impairment              History reviewed. No pertinent surgical history.             Social History     Socioeconomic History    Marital status: Single   Tobacco Use    Smoking status: Never    Smokeless tobacco: Never   Vaping Use    Vaping status: Never Used   Substance and Sexual Activity    Alcohol use: No    Drug use: Never   Other Topics Concern    Caffeine Concern No    Exercise No              Review of Systems    Positive for stated Chief Complaint: Fatigue    Other systems are as noted in HPI.  Constitutional and vital signs reviewed.      All other systems reviewed and negative except as noted above.    Physical Exam     ED Triage Vitals [09/24/24 1717]   BP (!) 152/94   Pulse 102   Resp 18   Temp 98.2 °F (36.8 °C)   Temp src Temporal   SpO2 99 %   O2 Device None (Room air)       Current Vitals:   Vital Signs  BP: (!) 152/94  Pulse: 102  Resp: 18  Temp: 98.2 °F (36.8 °C)  Temp src: Temporal    Oxygen Therapy  SpO2: 99 %  O2 Device: None (Room air)            Physical Exam  GENERAL: Patient is awake, alert, active and interactive.  HEENT: Conjunctiva are clear.  Pupils are equal round reactive to light.    Neck is supple with no pain to movement.  CHEST: Patient is breathing comfortably.  Lungs clear  HEART: Regular rate and rhythm no  murmur  ABDOMEN: nondistended, nontender  EXTREMITIES: Normal capillary refill.  SKIN: Well perfused, without cyanosis.  No rashes.  NEUROLOGIC: No focal deficits visualized.       ED Course     Labs Reviewed   URINALYSIS WITH CULTURE REFLEX - Abnormal; Notable for the following components:       Result Value    Clarity Urine Turbid (*)     Spec Gravity >1.030 (*)     Ketones Urine 10 (*)     Protein Urine 20 (*)     Squamous Epi. Cells Few (*)     Hyaline Casts Present (*)     All other components within normal limits   COMP METABOLIC PANEL (14) - Abnormal; Notable for the following components:    Glucose 105 (*)     BUN 8 (*)     Albumin 5.2 (*)     A/G Ratio 2.4 (*)     All other components within normal limits   TSH+FREE T4 - Normal   MONO QUAL, RFX TO EBV-VCA ON NEG - Normal   POCT PREGNANCY URINE - Normal   SARS-COV-2/FLU A AND B/RSV BY PCR (GENEXPERT) - Normal    Narrative:     This test is intended for the qualitative detection and differentiation of SARS-CoV-2, influenza A, influenza B, and respiratory syncytial virus (RSV) viral RNA in nasopharyngeal or nares swabs from individuals suspected of respiratory viral infection consistent with COVID-19 by their healthcare provider. Signs and symptoms of respiratory viral infection due to SARS-CoV-2, influenza, and RSV can be similar.    Test performed using the Xpert Xpress SARS-CoV-2/FLU/RSV (real time RT-PCR)  assay on the GeneXpert instrument, [employer], [place] [address].   This test is being used under the Food and Drug Administration's Emergency Use Authorization.    The authorized Fact Sheet for Healthcare Providers for this assay is available upon request from the laboratory.   CBC WITH DIFFERENTIAL WITH PLATELET   EBV, CHRONIC/ACTIVE INFECTION,IGG/IGM             It is unclear what is causing the patient's symptoms.  Laboratory studies are all reassuring.  I feel the patient safe for discharge outpatient follow-up and I explained have not come to a firm  diagnosis of cause of her symptoms.  Patient says she has an appointment to see her doctor tomorrow.  I recommend she keep that appointment.         MDM      Patient was screened and evaluated during this visit.   As a treating physician attending to the patient, I determined, within reasonable clinical confidence and prior to discharge, that an emergency medical condition was not or was no longer present.  There was no indication for further evaluation, treatment or admission on an emergency basis.  Comprehensive verbal and written discharge and follow-up instructions were provided to help prevent relapse or worsening.    Patient was instructed to follow-up with the primary care provider for further evaluation and treatment, but to return immediately to the ER for worsening, concerning, new, changing, or persisting symptoms.    I discussed my assessment and plan and answered all questions prior to discharge.  Patient/family expressed understanding and agreement with the plan.      Patient is alert, interactive, and in no distress upon discharge.    This report has been produced using speech recognition software and may contain errors related to that system including, but not limited to, errors in grammar, punctuation, and spelling, as well as words and phrases that possibly may have been recognized inappropriately.  If there are any questions or concerns, contact the dictating provider for clarification.                                     Medical Decision Making      Disposition and Plan     Clinical Impression:  1. Fatigue, unspecified type         Disposition:  Discharge  9/24/2024  7:39 pm    Follow-up:  Basilio Abdalla MD  [address] [place] [id number] [phone]    Schedule an appointment as soon as possible for a visit      [facility] Emergency Department  [address] [facility] [id number] [phone]  Follow up  Immediately if symptoms worsen, increased  concerns          Medications Prescribed:  Current Discharge Medication List

## 2024-09-24 NOTE — ED INITIAL ASSESSMENT (HPI)
Patient reports feeling fatigued, sleepy, increased hunger, frequent urination worsening over the last 2 weeks.

## 2024-09-25 ENCOUNTER — OFFICE VISIT (OUTPATIENT)
Dept: INTERNAL MEDICINE CLINIC | Facility: CLINIC | Age: 19
End: 2024-09-25
Payer: COMMERCIAL

## 2024-09-25 ENCOUNTER — LAB ENCOUNTER (OUTPATIENT)
Dept: LAB | Facility: HOSPITAL | Age: 19
End: 2024-09-25
Attending: PHYSICIAN ASSISTANT
Payer: COMMERCIAL

## 2024-09-25 VITALS
RESPIRATION RATE: 18 BRPM | HEART RATE: 97 BPM | DIASTOLIC BLOOD PRESSURE: 62 MMHG | SYSTOLIC BLOOD PRESSURE: 106 MMHG | BODY MASS INDEX: 20.84 KG/M2 | WEIGHT: 117.63 LBS | HEIGHT: 63 IN | OXYGEN SATURATION: 98 % | TEMPERATURE: 97 F

## 2024-09-25 DIAGNOSIS — R53.83 OTHER FATIGUE: ICD-10-CM

## 2024-09-25 DIAGNOSIS — R53.83 OTHER FATIGUE: Primary | ICD-10-CM

## 2024-09-25 DIAGNOSIS — R63.5 WEIGHT GAIN: ICD-10-CM

## 2024-09-25 DIAGNOSIS — R35.0 URINE FREQUENCY: ICD-10-CM

## 2024-09-25 LAB
APPEARANCE: CLEAR
BILIRUBIN: NEGATIVE
DEPRECATED HBV CORE AB SER IA-ACNC: 14 NG/ML
EBV NA IGG SER QL IA: POSITIVE
EBV VCA IGG SER QL IA: POSITIVE
EBV VCA IGM SER QL IA: NEGATIVE
EST. AVERAGE GLUCOSE BLD GHB EST-MCNC: 88 MG/DL (ref 68–126)
GLUCOSE (URINE DIPSTICK): NEGATIVE MG/DL
HBA1C MFR BLD: 4.7 % (ref ?–5.7)
IRON SATN MFR SERPL: 30 %
IRON SERPL-MCNC: 119 UG/DL
KETONES (URINE DIPSTICK): NEGATIVE MG/DL
LEUKOCYTES: NEGATIVE
MULTISTIX LOT#: NORMAL NUMERIC
NITRITE, URINE: NEGATIVE
OCCULT BLOOD: NEGATIVE
PH, URINE: 7 (ref 4.5–8)
PROTEIN (URINE DIPSTICK): NEGATIVE MG/DL
SPECIFIC GRAVITY: 1.02 (ref 1–1.03)
TOTAL IRON BINDING CAPACITY: 395 UG/DL (ref 250–425)
TRANSFERRIN SERPL-MCNC: 331 MG/DL (ref 250–380)
URINE-COLOR: YELLOW
UROBILINOGEN,SEMI-QN: 0.2 MG/DL (ref 0–1.9)
VIT B12 SERPL-MCNC: 322 PG/ML (ref 211–911)
VIT D+METAB SERPL-MCNC: 41.1 NG/ML (ref 30–100)

## 2024-09-25 PROCEDURE — 83550 IRON BINDING TEST: CPT

## 2024-09-25 PROCEDURE — 82306 VITAMIN D 25 HYDROXY: CPT

## 2024-09-25 PROCEDURE — 3008F BODY MASS INDEX DOCD: CPT | Performed by: PHYSICIAN ASSISTANT

## 2024-09-25 PROCEDURE — 81003 URINALYSIS AUTO W/O SCOPE: CPT | Performed by: PHYSICIAN ASSISTANT

## 2024-09-25 PROCEDURE — 36415 COLL VENOUS BLD VENIPUNCTURE: CPT

## 2024-09-25 PROCEDURE — 99214 OFFICE O/P EST MOD 30 MIN: CPT | Performed by: PHYSICIAN ASSISTANT

## 2024-09-25 PROCEDURE — 82607 VITAMIN B-12: CPT

## 2024-09-25 PROCEDURE — 83540 ASSAY OF IRON: CPT

## 2024-09-25 PROCEDURE — 83036 HEMOGLOBIN GLYCOSYLATED A1C: CPT

## 2024-09-25 PROCEDURE — 3074F SYST BP LT 130 MM HG: CPT | Performed by: PHYSICIAN ASSISTANT

## 2024-09-25 PROCEDURE — 82728 ASSAY OF FERRITIN: CPT

## 2024-09-25 PROCEDURE — 3078F DIAST BP <80 MM HG: CPT | Performed by: PHYSICIAN ASSISTANT

## 2024-09-25 NOTE — PROGRESS NOTES
Nury Ordonez is a 19 year old female.   Chief Complaint   Patient presents with    Fatigue     EJ RM 8- Pt is here for fatigue for a weekk    Urinary Frequency     HPI:    Pt presents with the following:    Fatigue x several weeks. Worse the last several days.   Difficulty staying awake throughout the day.   Sleeps 12 hours at night and wakes up feeling fatigued.   Works as a . Difficulty getting to work due to fatigue. She has had to call off frequently.   Follows with psychiatrist - no change in meds since April. She feels her mood is stable. Denies worsening anxiety and depression.   Increased urine frequency. Denies dysuria. Denies diarrhea, constipation, and blood in stool.   Denies fever and chills.   Migraines are stable. Last migraine was 1 week ago.   Weight gain. 8 lbs over the last several months.   Seen in the ER yesterday and labs were essentially normal including negative mono.   She takes trazodone 100 mg nightly for h/o insomnia.     Allergies:  Allergies   Allergen Reactions    Red Dye HIVES and NAUSEA ONLY      Current Meds:  Current Outpatient Medications   Medication Sig Dispense Refill    ORILISSA 150 MG Oral Tab TAKE 150 MG BY MOUTH DAILY FOR 30 DAYS.      Ferrous Sulfate (IRON OR) Take by mouth daily.      loratadine 10 MG Oral Tab Take 1 tablet (10 mg total) by mouth daily.      traZODone 100 MG Oral Tab Take 1 tablet (100 mg total) by mouth nightly.      DULoxetine 60 MG Oral Cap DR Particles Take 1 capsule (60 mg total) by mouth daily.      Levonorgestrel (LILETTA, 52 MG,) 20.1 MCG/DAY Intrauterine IUD 1 each by Intrauterine route one time.      albuterol 108 (90 Base) MCG/ACT Inhalation Aero Soln Inhale 2 puffs into the lungs every 6 (six) hours as needed for Wheezing. inhale 2 puff by inhalation route  every 4 - 6 hours as needed 3 each 3    PROPRANOLOL 20 MG Oral Tab TAKE 1 TABLET BY MOUTH EVERY DAY 90 tablet 0    HYDROcodone-acetaminophen 5-325 MG Oral Tab Take 1-2  tablets by mouth every 6 (six) hours as needed. 12 tablet 0    famotidine 40 MG Oral Tab Take 1 tablet (40 mg total) by mouth daily. 90 tablet 3    lamoTRIgine 100 MG Oral Tab Take 25 mg by mouth daily.      amphetamine-dextroamphetamine ER 10 MG Oral Capsule SR 24 Hr Take 1 capsule (10 mg total) by mouth every morning.      diazePAM 5 MG Oral Tab TAKE ONE TAB BY MOUTH 3 TIMES A DAY AS NEEDED FOR ANXIETY OR INSOMNIA      ubrogepant (UBRELVY) 50 MG Oral Tab Take one tablet at onset of migraine.  May take additional tablet in 2 hours if needed.  Do not exceed two tablets per 24 hour period. 10 tablet 3    albuterol (PROAIR HFA) 108 (90 Base) MCG/ACT Inhalation Aero Soln Inhale 2 puffs into the lungs every 4 (four) hours as needed for Shortness of Breath. 1 each 1    DULOXETINE 30 MG Oral Cap DR Particles TAKE 1 CAPSULE BY MOUTH EVERY DAY 90 capsule 0    Norgestimate-Eth Estradiol 0.25-35 MG-MCG Oral Tab Take 1 tablet by mouth daily. Wendy      ondansetron 4 MG Oral Tablet Dispersible Take 1 tablet (4 mg total) by mouth every 6 (six) hours as needed.      Multiple Vitamins-Minerals (MULTI-VITAMIN/MINERALS) Oral Tab Take 1 tablet by mouth daily.          PMH:     Past Medical History:    Anxiety state    Depression    Fibromyalgia    Migraines    Scoliosis    Visual impairment       ROS:   GENERAL: Negative for fever, chills and fatigue. NAD.  HENT: Negative for congestion, sore throat, and ear pain.  RESPIRATORY: Negative for cough, chest tightness, shortness of breath and wheezing.    CV: Negative for chest pain, palpitations and leg swelling.   GI: Negative for nausea, vomiting, abdominal pain, diarrhea, and blood in stool.   : Negative for dysuria, hematuria and difficulty urinating.   MUSCULOSKELETAL: Negative for myalgias, back pain, joint swelling, arthralgias and gait problem.   NEURO: Negative for dizziness, syncope, weakness, numbness, tingling and headaches.   PSYCH: The patient is not nervous/anxious. No  depression.      PHYSICAL EXAM:    /62   Pulse 97   Temp 96.8 °F (36 °C) (Temporal)   Resp 18   Ht 5' 3\" (1.6 m)   Wt 117 lb 9.6 oz (53.3 kg)   LMP  (LMP Unknown)   SpO2 98%   BMI 20.83 kg/m²     GENERAL: NAD. A&Ox3  HEENT: Ear canals clear, TMs pearly gray bilaterally. Throat without erythema or exudates.  NECK: No LAD.   RESPIRATORY: CTAB, no R/R/W  CV: RRR, no murmurs.   ABD: Soft, non-tender, non-distended. +bowel sounds. No rebound tenderness.   NEURO: No focal deficits.   MUSCULOSKELETAL: Full ROM of all extremities. No swelling.   PSYCH: Appropriate mood and affect.      ASSESSMENT/ PLAN:   1. Other fatigue  Unclear etiology   Could be viral syndrome   Will check additional labs today   No driving while feeling drowsy   Advised to discuss decreasing dose of trazodone with her psychiatrist   May need sleep study if persists   - Ferritin [E]; Future  - Iron And Tibc [E]; Future  - Vitamin D [E]; Future  - Vitamin B12 [E]; Future  - Hemoglobin A1C [E]; Future    2. Urine frequency  Repeat UA negative   Check A1c   - Urine Dip, auto without Micro  - Hemoglobin A1C [E]; Future    3. Weight gain  Check labs   - Hemoglobin A1C [E]; Future       Health Maintenance Due   Topic Date Due    Asthma Control Test  Never done    Pneumococcal Vaccine: Birth to 64yrs (1 of 1 - PPSV23 or PCV20) 09/01/2010    HPV Vaccines (1 - 3-dose series) Never done    Chlamydia Screening  08/18/2024    COVID-19 Vaccine (3 - 2023-24 season) 09/01/2024           Pt indicates understanding and agrees to the plan.     No follow-ups on file.    Nhi Cabral PA-C

## 2024-10-10 ENCOUNTER — TELEPHONE (OUTPATIENT)
Dept: INTERNAL MEDICINE CLINIC | Facility: CLINIC | Age: 19
End: 2024-10-10

## 2024-10-10 DIAGNOSIS — D50.0 IRON DEFICIENCY ANEMIA DUE TO CHRONIC BLOOD LOSS: Primary | ICD-10-CM

## 2024-10-10 NOTE — TELEPHONE ENCOUNTER
Received call from pt. Patient stated she saw Nhi Cabral a few weeks ago and was started on iron supplement. Patient does not feel this is helping her fatigue, stated fatigue actually feels slightly worse. Patient spoke to her father about this, and he informed her he had to get iron infusions in the past. Patient wanting to pursue iron infusions. Informed her she would need to see hematology for iron infusions, can ask Nhi Cabral about referral to hematology.     Routing to Nhi Cabral to update on pt. Patient wanting to pursue iron infusions, not feeling improvements with oral iron supp. Agree with hematology referral?

## 2024-10-11 NOTE — TELEPHONE ENCOUNTER
RN to pt call, unable to leave detailed VM on unidentified mailbox.  Pt is active on [facility] (LL 10/11/24), informed in brief VM that a [facility] message will be sent, to callback the office if she has any questions after reading the message.      Will postpone encounter to verify pt has viewed message.

## 2024-10-23 NOTE — PROGRESS NOTES
[facility] Note      Visit Diagnosis:  1. Iron deficiency      History of Present Illness:19F referred by Dr. Abdalla for iron deficiency.     She met with her PCP for significant fatigue. This has been present for quite some time but significantly worse in the past month. Labs-Ferritin 14. FE sat 40, HB 14.4. She start oral iron without any relief. History of heavy menses since age 12. She then had an IUD placed around age 16. She has chronic constipation. She has a BM 1-2 times a week. Has a lot of nausea and reflux symptoms. No relief with pepcid. FH of Ulcerative colitis in father. She has never seen GI    Review of Systems: 12 Point ROS was completed and pertinent positives are in the HPI    Medications Ordered Prior to Encounter[1]  Past Medical History:    Anxiety state    Depression    Fibromyalgia    Migraines    Scoliosis    Visual impairment     Past Surgical History:   Procedure Laterality Date    Appendectomy      Endometrial bx conjunct w/colposcopy      x2     Social History     Socioeconomic History    Marital status: Single   Tobacco Use    Smoking status: Never    Smokeless tobacco: Never   Vaping Use    Vaping status: Never Used   Substance and Sexual Activity    Alcohol use: No    Drug use: Never      Family History   Problem Relation Age of Onset    Anemia Father     Anxiety Father     Other (Other) Father         UC    Anxiety Mother     Depression Mother     Heart Disease Maternal Grandfather     High Blood Pressure Maternal Grandfather     Hypertension Maternal Grandfather     Cancer Paternal Grandmother         ovarian, omentum and liver    Anxiety Maternal Aunt     Bipolar Disorder Maternal Uncle     Bipolar Disorder Maternal Great-Grandmother        Physical Exam  Height: --  Weight: 54 kg (119 lb) (10/24 1357)  BSA (Calculated - sq m): --  Pulse: 87 (10/24 1357)  BP: 118/78 (10/24 1357)  Temp: 98.4 °F (36.9 °C) (10/24 1357)  Do Not Use - Resp Rate: --  SpO2: 100 %  (10/24 5434)    General: NAD, AOX3  HEENT: clear op, mmm, no jvd, no scleral icterus  CV: RR  Extremities: No edema   Lungs: no increased work of breathing  Abd: non distended   Neuro: CN: II-XII grossly intact    Results:  Lab Results   Component Value Date    WBC 9.9 09/24/2024    HGB 14.4 09/24/2024    HCT 40.1 09/24/2024    MCV 87.0 09/24/2024    .0 09/24/2024     Lab Results   Component Value Date     09/24/2024    K 3.5 09/24/2024    CO2 26.0 09/24/2024     09/24/2024    BUN 8 (L) 09/24/2024    ALB 5.2 (H) 09/24/2024       No results found for: \"LDH\"    Radiology: reviewed     Assessment and Plan:  Iron Deficiency  Fatigue  -She has a normal Hb. Iron deficiency is likely from menses but she will need a GI work up given her GI complaints and family history   -IV InFED x 1. Risks and benefits discussed   -Repeat labs in 3 months (ordered as standing)    Chronic constipation, GERD  -referred to GI    MDM: high-IV Iron    VERONIQUE Hogan MD  [facility]         [1]   Current Outpatient Medications on File Prior to Visit   Medication Sig Dispense Refill    ORILISSA 150 MG Oral Tab TAKE 150 MG BY MOUTH DAILY FOR 30 DAYS.      Ferrous Sulfate (IRON OR) Take by mouth daily.      loratadine 10 MG Oral Tab Take 1 tablet (10 mg total) by mouth daily.      traZODone 100 MG Oral Tab Take 1 tablet (100 mg total) by mouth nightly.      DULoxetine 60 MG Oral Cap DR Particles Take 1 capsule (60 mg total) by mouth daily.      Levonorgestrel (LILETTA, 52 MG,) 20.1 MCG/DAY Intrauterine IUD 1 each by Intrauterine route one time.      albuterol 108 (90 Base) MCG/ACT Inhalation Aero Soln Inhale 2 puffs into the lungs every 6 (six) hours as needed for Wheezing. inhale 2 puff by inhalation route  every 4 - 6 hours as needed 3 each 3    PROPRANOLOL 20 MG Oral Tab TAKE 1 TABLET BY MOUTH EVERY DAY 90 tablet 0    HYDROcodone-acetaminophen 5-325 MG Oral Tab Take 1-2 tablets by mouth every 6 (six)  hours as needed. 12 tablet 0    famotidine 40 MG Oral Tab Take 1 tablet (40 mg total) by mouth daily. 90 tablet 3    lamoTRIgine 100 MG Oral Tab Take 25 mg by mouth daily.      amphetamine-dextroamphetamine ER 10 MG Oral Capsule SR 24 Hr Take 1 capsule (10 mg total) by mouth every morning.      diazePAM 5 MG Oral Tab TAKE ONE TAB BY MOUTH 3 TIMES A DAY AS NEEDED FOR ANXIETY OR INSOMNIA      ubrogepant (UBRELVY) 50 MG Oral Tab Take one tablet at onset of migraine.  May take additional tablet in 2 hours if needed.  Do not exceed two tablets per 24 hour period. 10 tablet 3    albuterol (PROAIR HFA) 108 (90 Base) MCG/ACT Inhalation Aero Soln Inhale 2 puffs into the lungs every 4 (four) hours as needed for Shortness of Breath. 1 each 1    DULOXETINE 30 MG Oral Cap DR Particles TAKE 1 CAPSULE BY MOUTH EVERY DAY 90 capsule 0    ondansetron 4 MG Oral Tablet Dispersible Take 1 tablet (4 mg total) by mouth every 6 (six) hours as needed.      Multiple Vitamins-Minerals (MULTI-VITAMIN/MINERALS) Oral Tab Take 1 tablet by mouth daily.      Norgestimate-Eth Estradiol 0.25-35 MG-MCG Oral Tab Take 1 tablet by mouth daily. Wendy       Current Facility-Administered Medications on File Prior to Visit   Medication Dose Route Frequency Provider Last Rate Last Admin    [COMPLETED] sodium chloride 0.9 % IV bolus 1,000 mL  1,000 mL Intravenous Once Clark Gallegos MD   Stopped at 09/24/24 1944    [COMPLETED] lidocaine in sodium bicarbonate (Buffered Lidocaine) 1% - 0.25 ML intradermal J-tip syringe 0.25 mL  0.25 mL Intradermal Once Clark Gallegos MD   0.25 mL at 09/24/24 5139

## 2024-10-24 ENCOUNTER — OFFICE VISIT (OUTPATIENT)
Dept: HEMATOLOGY/ONCOLOGY | Facility: HOSPITAL | Age: 19
End: 2024-10-24
Attending: INTERNAL MEDICINE
Payer: COMMERCIAL

## 2024-10-24 VITALS
WEIGHT: 119 LBS | SYSTOLIC BLOOD PRESSURE: 118 MMHG | DIASTOLIC BLOOD PRESSURE: 78 MMHG | OXYGEN SATURATION: 100 % | HEART RATE: 87 BPM | BODY MASS INDEX: 21 KG/M2 | TEMPERATURE: 98 F | RESPIRATION RATE: 16 BRPM

## 2024-10-24 DIAGNOSIS — E61.1 IRON DEFICIENCY: Primary | ICD-10-CM

## 2024-10-24 DIAGNOSIS — G43.709 CHRONIC MIGRAINE W/O AURA W/O STATUS MIGRAINOSUS, NOT INTRACTABLE: ICD-10-CM

## 2024-10-24 PROCEDURE — 99245 OFF/OP CONSLTJ NEW/EST HI 55: CPT | Performed by: INTERNAL MEDICINE

## 2024-10-24 NOTE — PROGRESS NOTES
Patient here as a new consult for anemia. Energy levels are low. On oral iron once daily. Has some lightheadedness. Denies any signs of bleeding or heavy periods.

## 2024-10-24 NOTE — TELEPHONE ENCOUNTER
Overdue for appointment. [employer] message sent.     Medication: Propranolol     Date of last refill: 7/18/24 for #90/0 additional refills  [place]    Last office visit: 1/18/24  Due back to clinic per last office note:  6 months  Date next office visit scheduled:  not yet scheduled    Last OV note recommendation: Per Dr. Simpson: \"   Plan:  Reviewed  MRI BRAIN MRA HEAD+MRA NECK , negative  NO Imitrex due to chest pain  Ubrelvy prn  Propranolol started  Headache diary advised  Migraine and Medication overuse headache education given  PCP and Psychiatrist to follow re; anxiety  See orders and medications filed with this encounter. The patient indicates understanding of these issues and agrees with the plan.  Discussed with patient regarding assessment, work up, care plan and possible adverse and side effects of the medications.  RTC 6 months, needs update in 2 months other options; qulipta, amovig  Pt should go ER for any new or worsening symptoms and contact office for above tests' results,\"

## 2024-10-25 PROBLEM — M79.7 FIBROMYALGIA: Status: ACTIVE | Noted: 2024-10-25

## 2024-10-25 RX ORDER — PROPRANOLOL HCL 20 MG
20 TABLET ORAL DAILY
Qty: 90 TABLET | Refills: 0 | Status: SHIPPED | OUTPATIENT
Start: 2024-10-25

## 2024-11-15 ENCOUNTER — LAB ENCOUNTER (OUTPATIENT)
Dept: LAB | Facility: HOSPITAL | Age: 19
End: 2024-11-15
Attending: PHYSICIAN ASSISTANT
Payer: COMMERCIAL

## 2024-11-15 ENCOUNTER — OFFICE VISIT (OUTPATIENT)
Dept: INTERNAL MEDICINE CLINIC | Facility: CLINIC | Age: 19
End: 2024-11-15
Payer: COMMERCIAL

## 2024-11-15 VITALS
OXYGEN SATURATION: 98 % | TEMPERATURE: 98 F | HEIGHT: 63 IN | RESPIRATION RATE: 16 BRPM | WEIGHT: 116.63 LBS | SYSTOLIC BLOOD PRESSURE: 108 MMHG | HEART RATE: 103 BPM | BODY MASS INDEX: 20.66 KG/M2 | DIASTOLIC BLOOD PRESSURE: 60 MMHG

## 2024-11-15 DIAGNOSIS — K59.09 CHRONIC CONSTIPATION: ICD-10-CM

## 2024-11-15 DIAGNOSIS — R00.0 TACHYCARDIA: Primary | ICD-10-CM

## 2024-11-15 DIAGNOSIS — B07.9 VIRAL WARTS, UNSPECIFIED TYPE: ICD-10-CM

## 2024-11-15 DIAGNOSIS — R55 SYNCOPE, UNSPECIFIED SYNCOPE TYPE: ICD-10-CM

## 2024-11-15 DIAGNOSIS — D50.0 IRON DEFICIENCY ANEMIA DUE TO CHRONIC BLOOD LOSS: ICD-10-CM

## 2024-11-15 DIAGNOSIS — F41.1 GAD (GENERALIZED ANXIETY DISORDER): ICD-10-CM

## 2024-11-15 LAB — IGA SERPL-MCNC: <33 MG/DL (ref 70–312)

## 2024-11-15 PROCEDURE — 90656 IIV3 VACC NO PRSV 0.5 ML IM: CPT | Performed by: PHYSICIAN ASSISTANT

## 2024-11-15 PROCEDURE — 86364 TISS TRNSGLTMNASE EA IG CLAS: CPT

## 2024-11-15 PROCEDURE — 90472 IMMUNIZATION ADMIN EACH ADD: CPT | Performed by: PHYSICIAN ASSISTANT

## 2024-11-15 PROCEDURE — 3074F SYST BP LT 130 MM HG: CPT | Performed by: PHYSICIAN ASSISTANT

## 2024-11-15 PROCEDURE — 3008F BODY MASS INDEX DOCD: CPT | Performed by: PHYSICIAN ASSISTANT

## 2024-11-15 PROCEDURE — 36415 COLL VENOUS BLD VENIPUNCTURE: CPT

## 2024-11-15 PROCEDURE — 86258 DGP ANTIBODY EACH IG CLASS: CPT

## 2024-11-15 PROCEDURE — 99214 OFFICE O/P EST MOD 30 MIN: CPT | Performed by: PHYSICIAN ASSISTANT

## 2024-11-15 PROCEDURE — 90471 IMMUNIZATION ADMIN: CPT | Performed by: PHYSICIAN ASSISTANT

## 2024-11-15 PROCEDURE — 82784 ASSAY IGA/IGD/IGG/IGM EACH: CPT

## 2024-11-15 PROCEDURE — 90677 PCV20 VACCINE IM: CPT | Performed by: PHYSICIAN ASSISTANT

## 2024-11-15 PROCEDURE — 3078F DIAST BP <80 MM HG: CPT | Performed by: PHYSICIAN ASSISTANT

## 2024-11-15 NOTE — PROGRESS NOTES
Nury Ordonez is a 19 year old female.   Chief Complaint   Patient presents with    Fatigue     Yb rm 9 Wednesday  passed out, fatigued  for months        HPI:    Pt presents for f/u.     Fatigue. Chronic issue. She has iron deficiency and was seen by hematology but has not scheduled iron infusion yet.     Intermittent syncope. This is a chronic issue. She feels her heart rate increase prior to the syncope. Episodes last for 30-40 seconds. Denies CP and SOB. She completed an echo and a holter in the past but these did not show any significant abnormalities. No fatigue/confusion following the episodes.     Wart on finger. No relief with otc remedies. She has a dermatologist but has not been seen for this.       Allergies:  Allergies[1]   Current Meds:  Current Outpatient Medications   Medication Sig Dispense Refill    propranolol 20 MG Oral Tab Take 1 tablet (20 mg total) by mouth daily. 90 tablet 0    ORILISSA 150 MG Oral Tab TAKE 150 MG BY MOUTH DAILY FOR 30 DAYS.      Ferrous Sulfate (IRON OR) Take by mouth daily.      loratadine 10 MG Oral Tab Take 1 tablet (10 mg total) by mouth daily.      traZODone 100 MG Oral Tab Take 1 tablet (100 mg total) by mouth nightly.      DULoxetine 60 MG Oral Cap DR Particles Take 1 capsule (60 mg total) by mouth daily.      Levonorgestrel (LILETTA, 52 MG,) 20.1 MCG/DAY Intrauterine IUD 1 each by Intrauterine route one time.      albuterol 108 (90 Base) MCG/ACT Inhalation Aero Soln Inhale 2 puffs into the lungs every 6 (six) hours as needed for Wheezing. inhale 2 puff by inhalation route  every 4 - 6 hours as needed 3 each 3    HYDROcodone-acetaminophen 5-325 MG Oral Tab Take 1-2 tablets by mouth every 6 (six) hours as needed. 12 tablet 0    famotidine 40 MG Oral Tab Take 1 tablet (40 mg total) by mouth daily. 90 tablet 3    lamoTRIgine 100 MG Oral Tab Take 25 mg by mouth daily.      amphetamine-dextroamphetamine ER 10 MG Oral Capsule SR 24 Hr Take 1 capsule (10 mg total) by  mouth every morning.      diazePAM 5 MG Oral Tab TAKE ONE TAB BY MOUTH 3 TIMES A DAY AS NEEDED FOR ANXIETY OR INSOMNIA      ubrogepant (UBRELVY) 50 MG Oral Tab Take one tablet at onset of migraine.  May take additional tablet in 2 hours if needed.  Do not exceed two tablets per 24 hour period. 10 tablet 3    albuterol (PROAIR HFA) 108 (90 Base) MCG/ACT Inhalation Aero Soln Inhale 2 puffs into the lungs every 4 (four) hours as needed for Shortness of Breath. 1 each 1    DULOXETINE 30 MG Oral Cap DR Particles TAKE 1 CAPSULE BY MOUTH EVERY DAY 90 capsule 0    ondansetron 4 MG Oral Tablet Dispersible Take 1 tablet (4 mg total) by mouth every 6 (six) hours as needed.      Multiple Vitamins-Minerals (MULTI-VITAMIN/MINERALS) Oral Tab Take 1 tablet by mouth daily.      Norgestimate-Eth Estradiol 0.25-35 MG-MCG Oral Tab Take 1 tablet by mouth daily. Wendy          PMH:     Past Medical History:    Allergic rhinitis    Anxiety    currently in OP anxiety program    Anxiety state    Asthma (HCC)    Depression    Fibromyalgia    Migraines    Scoliosis    Visual impairment       ROS:   Review of Systems   Constitutional:  Positive for fatigue. Negative for chills and fever.   HENT:  Negative for congestion, sinus pressure, sinus pain and sore throat.    Respiratory:  Negative for cough, shortness of breath and wheezing.    Cardiovascular:  Positive for palpitations. Negative for chest pain and leg swelling.   Gastrointestinal:  Positive for constipation. Negative for abdominal pain, blood in stool, diarrhea, nausea and vomiting.   Genitourinary:  Negative for dysuria, flank pain, frequency, hematuria and urgency.   Musculoskeletal:  Negative for arthralgias, back pain and gait problem.   Neurological:  Positive for syncope and light-headedness. Negative for dizziness and headaches.         PHYSICAL EXAM:    /60   Pulse 103   Temp 97.9 °F (36.6 °C) (Temporal)   Resp 16   Ht 5' 3\" (1.6 m)   Wt 116 lb 9.6 oz (52.9 kg)    LMP  (LMP Unknown)   SpO2 98%   BMI 20.65 kg/m²     GENERAL: NAD. A&Ox3  HEENT: Ear canals clear, TMs pearly gray bilaterally. Throat without erythema or exudates.  NECK: No LAD.   RESPIRATORY: CTAB, no R/R/W  CV: RRR, no murmurs.   PSYCH: Appropriate mood and affect.      ASSESSMENT/ PLAN:   1. Tachycardia  Concern for possible POTS  See cardiology for further eval   - Cardio Referral - Internal  - Integrative Medicine Physician Consultation ([place]) - Internal Referral    2. Syncope, unspecified syncope type  Concern for possible POTS  See cardiology for further eval   - Cardio Referral - Internal  - Integrative Medicine Physician Consultation ([place]) - Internal Referral    3. DAO (generalized anxiety disorder)  Followed by psych   She requests referral to functional med as well   - Integrative Medicine Physician Consultation ([place]) - Internal Referral    4. Iron deficiency anemia due to chronic blood loss  F/u with hematology for iron infusion   F/u with GI as hematology told her possible malabsorption issue   - Integrative Medicine Physician Consultation ([place]) - Internal Referral    5. Chronic constipation  F/u with GI   - CELIAC DISEASE SCREEN Reflex [E]; Future    6. Viral warts, unspecified type  See derm        Health Maintenance Due   Topic Date Due    Pneumococcal Vaccine: Birth to 64yrs (1 of 1 - PPSV23 or PCV20) 09/01/2010    HPV Vaccines (1 - 3-dose series) Never done    COVID-19 Vaccine (3 - 2024-25 season) 09/01/2024    Influenza Vaccine (1) 10/01/2024           Pt indicates understanding and agrees to the plan.     No follow-ups on file.    Nhi Cabral PA-C       [1]   Allergies  Allergen Reactions    Red Dye HIVES and NAUSEA ONLY

## 2024-11-18 LAB
GLIADIN IGA SER-ACNC: <0.2 U/ML (ref ?–7)
GLIADIN IGG SER-ACNC: 0.7 U/ML (ref ?–7)
TTG IGA SER-ACNC: <0.2 U/ML (ref ?–7)
TTG IGG SER-ACNC: <0.6 U/ML (ref ?–7)

## 2024-11-19 PROBLEM — R10.2 PELVIC PAIN: Status: ACTIVE | Noted: 2024-07-03

## 2024-11-19 PROBLEM — J45.909 ASTHMA (HCC): Status: ACTIVE | Noted: 2024-11-19

## 2024-11-19 PROBLEM — K21.9 GERD (GASTROESOPHAGEAL REFLUX DISEASE): Status: ACTIVE | Noted: 2024-07-16

## 2024-11-20 ENCOUNTER — OFFICE VISIT (OUTPATIENT)
Dept: HEMATOLOGY/ONCOLOGY | Facility: HOSPITAL | Age: 19
End: 2024-11-20
Attending: INTERNAL MEDICINE
Payer: COMMERCIAL

## 2024-11-20 VITALS
SYSTOLIC BLOOD PRESSURE: 113 MMHG | OXYGEN SATURATION: 100 % | DIASTOLIC BLOOD PRESSURE: 78 MMHG | HEART RATE: 90 BPM | TEMPERATURE: 98 F | RESPIRATION RATE: 16 BRPM

## 2024-11-20 DIAGNOSIS — E61.1 IRON DEFICIENCY: Primary | ICD-10-CM

## 2024-11-20 PROCEDURE — 96365 THER/PROPH/DIAG IV INF INIT: CPT

## 2024-11-20 NOTE — PROGRESS NOTES
Pt here for INFED . Pt denies any issues or concerns.      Ordering Provider: Dr. Hogan  Order Exp: One-time dose     Pt tolerated infusion without difficulty or complaint. Reviewed next apt date/time: No appointments at this time. Patient reminded to have labs done in 3 months to reassess iron stores.      Education Record  Learner:  Patient  Disease / Diagnosis: Iron Deficiency  Barriers / Limitations:  None  Method:  Brief focused, Discussion, and Reinforcement  General Topics:  Medication, Side effects and symptom management, and Plan of care reviewed  Outcome:  Shows understanding  Tolerated iron infusion without any issues. Vital signs taken and recorded after a 5-minute flush. Patient discharged in good condition.

## 2024-11-21 ENCOUNTER — OFFICE VISIT (OUTPATIENT)
Dept: NEUROLOGY | Facility: CLINIC | Age: 19
End: 2024-11-21
Payer: COMMERCIAL

## 2024-11-21 VITALS
DIASTOLIC BLOOD PRESSURE: 72 MMHG | WEIGHT: 115 LBS | HEART RATE: 104 BPM | BODY MASS INDEX: 20 KG/M2 | SYSTOLIC BLOOD PRESSURE: 122 MMHG | RESPIRATION RATE: 16 BRPM

## 2024-11-21 DIAGNOSIS — R56.9 NEW ONSET SEIZURE (HCC): ICD-10-CM

## 2024-11-21 DIAGNOSIS — G43.709 CHRONIC MIGRAINE W/O AURA W/O STATUS MIGRAINOSUS, NOT INTRACTABLE: Primary | ICD-10-CM

## 2024-11-21 PROCEDURE — 3074F SYST BP LT 130 MM HG: CPT | Performed by: OTHER

## 2024-11-21 PROCEDURE — 99215 OFFICE O/P EST HI 40 MIN: CPT | Performed by: OTHER

## 2024-11-21 PROCEDURE — 3078F DIAST BP <80 MM HG: CPT | Performed by: OTHER

## 2024-11-21 RX ORDER — LAMOTRIGINE 25 MG/1
TABLET ORAL
Qty: 540 TABLET | Refills: 3 | Status: SHIPPED | OUTPATIENT
Start: 2024-11-21

## 2024-11-21 NOTE — PROGRESS NOTES
[facility] Neurology Outpatient Progress Note  Date of service: 11/21/2024    Assessment:     ICD-10-CM    1. Chronic migraine w/o aura w/o status migrainosus, not intractable  G43.709       2. New onset seizure (HCC)  R56.9 EEG          Plan:  Reviewed  MRI BRAIN MRA HEAD+MRA NECK , negative  NO Imitrex due to chest pain  Ubrelvy prn  Propranolol   Increase lamictal to 50 mg, 100mg (pt on lamictal 100 mg by psychiatrist for a long time)  PCP and Psychiatrist to follow re; anxiety  Pt should not drive, operate heavy machinery, swim, or climb ladders until further notice  See orders and medications filed with this encounter. The patient indicates understanding of these issues and agrees with the plan.  Discussed with patient regarding assessment, work up, care plan and possible adverse and side effects of the medications.  RTC 3 months, other options for migraine; qulipta, amovig  Pt should go ER for any new or worsening symptoms and contact office for above tests' results,   A total of 40 minutes were spent on patient care,  more than 50% was spent counseling patient/family regarding studies' results, assessment, treatment options and care plan, 10 minutes were spent in (pre- and/or during visit) reviewing clinical data including imaging, labs and progress notes related to therapy or treatment.      Subjective:   History:  Patient here for a follow-up visit for new onset seizure last week, first body tensed up then shaking , this was her 1st seizure, no new meds change or any possible triggering factors pt could think of.  Per initial visit:  Nury Ordonez is a 18 year old female with past medical history as listed below presents here for initial evaluation of worsening headache, she states having headaches and migraines. Pt reports different type headaches, currently feels band around frontal area which is dfferent from typical migraine, her migraine starts in left eye, this persistent headache has started since  early October and has not resolved, she tried and failed many OTC pain meds and norco, +family history of aneurysm, her grandfather had cerebral aneurysm.    History/Other:   REVIEW OF SYSTEMS:  A 10-point system was reviewed. Pertinent positives and negatives are noted as above       Current Outpatient Medications:     lamoTRIgine 25 MG Oral Tab, 2 tabs(50mg) in am, 4 tabs (100mg) hs po, Disp: 540 tablet, Rfl: 3    linaCLOtide (LINZESS) 72 MCG Oral Cap, Take 72 mcg by mouth daily., Disp: 90 capsule, Rfl: 3    propranolol 20 MG Oral Tab, Take 1 tablet (20 mg total) by mouth daily., Disp: 90 tablet, Rfl: 0    ORILISSA 150 MG Oral Tab, TAKE 150 MG BY MOUTH DAILY FOR 30 DAYS., Disp: , Rfl:     loratadine 10 MG Oral Tab, Take 1 tablet (10 mg total) by mouth daily., Disp: , Rfl:     traZODone 100 MG Oral Tab, Take 1 tablet (100 mg total) by mouth nightly., Disp: , Rfl:     DULoxetine 60 MG Oral Cap DR Particles, Take 1 capsule (60 mg total) by mouth daily., Disp: , Rfl:     Levonorgestrel (LILETTA, 52 MG,) 20.1 MCG/DAY Intrauterine IUD, 1 each by Intrauterine route one time., Disp: , Rfl:     albuterol 108 (90 Base) MCG/ACT Inhalation Aero Soln, Inhale 2 puffs into the lungs every 6 (six) hours as needed for Wheezing. inhale 2 puff by inhalation route  every 4 - 6 hours as needed, Disp: 3 each, Rfl: 3    HYDROcodone-acetaminophen 5-325 MG Oral Tab, Take 1-2 tablets by mouth every 6 (six) hours as needed., Disp: 12 tablet, Rfl: 0    famotidine 40 MG Oral Tab, Take 1 tablet (40 mg total) by mouth daily., Disp: 90 tablet, Rfl: 3    amphetamine-dextroamphetamine ER 10 MG Oral Capsule SR 24 Hr, Take 1 capsule (10 mg total) by mouth every morning., Disp: , Rfl:     diazePAM 5 MG Oral Tab, TAKE ONE TAB BY MOUTH 3 TIMES A DAY AS NEEDED FOR ANXIETY OR INSOMNIA, Disp: , Rfl:     ubrogepant (UBRELVY) 50 MG Oral Tab, Take one tablet at onset of migraine.  May take additional tablet in 2 hours if needed.  Do not exceed two tablets  per 24 hour period., Disp: 10 tablet, Rfl: 3    albuterol (PROAIR HFA) 108 (90 Base) MCG/ACT Inhalation Aero Soln, Inhale 2 puffs into the lungs every 4 (four) hours as needed for Shortness of Breath., Disp: 1 each, Rfl: 1    DULOXETINE 30 MG Oral Cap DR Particles, TAKE 1 CAPSULE BY MOUTH EVERY DAY, Disp: 90 capsule, Rfl: 0    ondansetron 4 MG Oral Tablet Dispersible, Take 1 tablet (4 mg total) by mouth every 6 (six) hours as needed., Disp: , Rfl:     Multiple Vitamins-Minerals (MULTI-VITAMIN/MINERALS) Oral Tab, Take 1 tablet by mouth daily., Disp: , Rfl:     Norgestimate-Eth Estradiol 0.25-35 MG-MCG Oral Tab, Take 1 tablet by mouth daily. Wendy, Disp: , Rfl:   Allergies:  Allergies[1]  Past Medical History:    Abdominal pain    Allergic rhinitis    Anemia    Anxiety    currently in OP anxiety program    Anxiety state    Asthma (HCC)    Back pain    Bloating    Blood in the stool    Constipation    Depression    Diarrhea, unspecified    Dizziness    Fatigue    Fibromyalgia    Heart palpitations    Heartburn    Hemorrhoids    History of depression    Indigestion    Irregular bowel habits    Loss of appetite    Migraines    Nausea    Pain with bowel movements    Scoliosis    Shortness of breath    Sleep disturbance    Stress    Syncope    Uncomfortable fullness after meals    Visual impairment    Wears glasses     Past Surgical History:   Procedure Laterality Date    Appendectomy      Appendectomy      Endometrial bx conjunct w/colposcopy      x2    Other surgical history      9/23 - Endometriosis excision laparoscopy 7/24 Endometriosis excision laparoscopy     Social History:  Social History     Tobacco Use    Smoking status: Never    Smokeless tobacco: Never   Substance Use Topics    Alcohol use: No     Family History   Problem Relation Age of Onset    Ulcerative Colitis Father     Anemia Father     Anxiety Father     Other (Other) Father         UC    Asthma Father     Depression Father         Anxiety    Anxiety  Mother     Depression Mother         Anxiety    Diabetes Maternal Grandfather     Heart Disease Maternal Grandfather     High Blood Pressure Maternal Grandfather     Hypertension Maternal Grandfather     Stroke Maternal Grandfather     Ovarian Cancer Paternal Grandmother     Cancer Paternal Grandmother         ovarian, omentum and liver    Anxiety Maternal Aunt     Bipolar Disorder Maternal Uncle     Bipolar Disorder Maternal Great-Grandmother       Objective:   Neurological Examination:  /72   Pulse 104   Resp 16   Wt 115 lb (52.2 kg)   LMP  (LMP Unknown)   BMI 20.37 kg/m²   Mental status: A & O X 3  Language: no aphasia  Speech: no dysarthria  CN II-XII: intact   Motor strength: 5/5 all extremities  Tone: normal  DTRs: 2+ symmetric  Coordination: normal  Sensory: symmetric  Gait: normal    Test reviewed on 11/21/2024      Saulo \"Sixto\"MD [place]  Neurology  [facility]  11/21/2024, 3:37 PM  CC: Basilio Abdalla MD         [1]   Allergies  Allergen Reactions    Red Dye HIVES and NAUSEA ONLY

## 2024-11-21 NOTE — PATIENT INSTRUCTIONS
Refill policies:    Allow 2-3 business days for refills; controlled substances may take longer.  Contact your pharmacy at least 5 days prior to running out of medication and have them send an electronic request or submit request through the “request refill” option in your [employer] account.  Refills are not addressed on weekends; covering physicians do not authorize routine medications on weekends.  No narcotics or controlled substances are refilled after noon on Fridays or by on call physicians.  By law, narcotics must be electronically prescribed.  A 30 day supply with no refills is the maximum allowed.  If your prescription is due for a refill, you may be due for a follow up appointment.  To best provide you care, patients receiving routine medications need to be seen at least once a year.  Patients receiving narcotic/controlled substance medications need to be seen at least once every 3 months.  In the event that your preferred pharmacy does not have the requested medication in stock (e.g. Backordered), it is your responsibility to find another pharmacy that has the requested medication available.  We will gladly send a new prescription to that pharmacy at your request.    Scheduling Tests:    If your physician has ordered radiology tests such as MRI or CT scans, please contact Central Scheduling at [phone] right away to schedule the test.  Once scheduled, the [facility] Centralized Referral Team will work with your insurance carrier to obtain pre-certification or prior authorization.  Depending on your insurance carrier, approval may take 3-10 days.  It is highly recommended patients assure they have received an authorization before having a test performed.  If test is done without insurance authorization, patient may be responsible for the entire amount billed.      Precertification and Prior Authorizations:  If your physician has recommended that you have a procedure or additional testing performed the [facility]  Centralized Referral Team will contact your insurance carrier to obtain pre-certification or prior authorization.    You are strongly encouraged to contact your insurance carrier to verify that your procedure/test has been approved and is a COVERED benefit.  Although the [facility] Centralized Referral Team does its due diligence, the insurance carrier gives the disclaimer that \"Although the procedure is authorized, this does not guarantee payment.\"    Ultimately the patient is responsible for payment.   Thank you for your understanding in this matter.  Paperwork Completion:  If you require FMLA or disability paperwork for your recovery, please make sure to either drop it off or have it faxed to our office at [phone]. Be sure the form has your name and date of birth on it.  The form will be faxed to our Forms Department and they will complete it for you.  There is a 25$ fee for all forms that need to be filled out.  Please be aware there is a 10-14 day turnaround time.  You will need to sign a release of information (RACHAEL) form if your paperwork does not come with one.  You may call the Forms Department with any questions at [phone].  Their fax number is [phone].

## 2025-01-06 PROBLEM — D50.9 IRON DEFICIENCY ANEMIA: Status: ACTIVE | Noted: 2025-01-06

## 2025-01-06 PROBLEM — R10.13 ABDOMINAL PAIN, EPIGASTRIC: Status: ACTIVE | Noted: 2025-01-06

## 2025-01-06 PROBLEM — R10.13 EPIGASTRIC PAIN: Status: ACTIVE | Noted: 2025-01-06

## 2025-01-06 PROBLEM — D50.9 IRON DEFICIENCY ANEMIA, UNSPECIFIED: Status: ACTIVE | Noted: 2025-01-06

## 2025-01-11 DIAGNOSIS — G43.709 CHRONIC MIGRAINE W/O AURA W/O STATUS MIGRAINOSUS, NOT INTRACTABLE: Primary | ICD-10-CM

## 2025-01-13 RX ORDER — UBROGEPANT 50 MG/1
TABLET ORAL
Qty: 10 TABLET | Refills: 2 | Status: SHIPPED | OUTPATIENT
Start: 2025-01-13 | End: 2026-01-11

## 2025-01-13 NOTE — TELEPHONE ENCOUNTER
Medication: ubrogepant (UBRELVY) 50 MG Oral Tab      Date of last refill: 01/18/2024 (#10/3)  [place]     Last office visit: 11/21/2024  Due back to clinic per last office note:  3 months  Date next office visit scheduled:    Future Appointments   Date Time Provider Department Center   1/15/2025  1:00 PM Nhi Cabral PA-C [facility] [address] [facility]   1/22/2025  9:45 AM [facility]1 [facility] [place]   2/7/2025  3:00 PM Torito Diaz MD [facility]   2/12/2025  9:45 AM Torito Diaz MD [facility]   2/27/2025  3:40 PM Saulo Simpson MD [facility] [address] [facility] [address]           Last OV note recommendation:    Plan:  Reviewed  MRI BRAIN MRA HEAD+MRA NECK , negative  NO Imitrex due to chest pain  Ubrelvy prn  Propranolol   Increase lamictal to 50 mg, 100mg (pt on lamictal 100 mg by psychiatrist for a long time)  PCP and Psychiatrist to follow re; anxiety  Pt should not drive, operate heavy machinery, swim, or climb ladders until further notice  See orders and medications filed with this encounter. The patient indicates understanding of these issues and agrees with the plan.  Discussed with patient regarding assessment, work up, care plan and possible adverse and side effects of the medications.  RTC 3 months, other options for migraine; qulipta, amovig  Pt should go ER for any new or worsening symptoms and contact office for above tests' results,   A total of 40 minutes were spent on patient care,  more than 50% was spent counseling patient/family regarding studies' results, assessment, treatment options and care plan, 10 minutes were spent in (pre- and/or during visit) reviewing clinical data including imaging, labs and progress notes related to therapy or treatment.

## 2025-01-15 ENCOUNTER — OFFICE VISIT (OUTPATIENT)
Dept: INTERNAL MEDICINE CLINIC | Facility: CLINIC | Age: 20
End: 2025-01-15
Payer: COMMERCIAL

## 2025-01-15 ENCOUNTER — LAB ENCOUNTER (OUTPATIENT)
Dept: LAB | Facility: HOSPITAL | Age: 20
End: 2025-01-15
Attending: INTERNAL MEDICINE
Payer: COMMERCIAL

## 2025-01-15 VITALS
HEIGHT: 63 IN | SYSTOLIC BLOOD PRESSURE: 104 MMHG | WEIGHT: 119.19 LBS | TEMPERATURE: 98 F | RESPIRATION RATE: 18 BRPM | DIASTOLIC BLOOD PRESSURE: 64 MMHG | HEART RATE: 90 BPM | BODY MASS INDEX: 21.12 KG/M2 | OXYGEN SATURATION: 99 %

## 2025-01-15 DIAGNOSIS — E61.1 IRON DEFICIENCY: ICD-10-CM

## 2025-01-15 DIAGNOSIS — R10.13 EPIGASTRIC PAIN: ICD-10-CM

## 2025-01-15 DIAGNOSIS — G90.1 DYSAUTONOMIA (HCC): ICD-10-CM

## 2025-01-15 DIAGNOSIS — K21.9 GASTROESOPHAGEAL REFLUX DISEASE, UNSPECIFIED WHETHER ESOPHAGITIS PRESENT: ICD-10-CM

## 2025-01-15 DIAGNOSIS — M79.7 FIBROMYALGIA: ICD-10-CM

## 2025-01-15 DIAGNOSIS — G90.1 DYSAUTONOMIA (HCC): Primary | ICD-10-CM

## 2025-01-15 DIAGNOSIS — F41.1 GAD (GENERALIZED ANXIETY DISORDER): ICD-10-CM

## 2025-01-15 DIAGNOSIS — N80.9 ENDOMETRIOSIS: ICD-10-CM

## 2025-01-15 LAB
ALBUMIN SERPL-MCNC: 4.4 G/DL (ref 3.2–4.8)
ALBUMIN/GLOB SERPL: 2.1 {RATIO} (ref 1–2)
ALP LIVER SERPL-CCNC: 55 U/L
ALT SERPL-CCNC: 25 U/L
ANION GAP SERPL CALC-SCNC: 5 MMOL/L (ref 0–18)
AST SERPL-CCNC: 18 U/L (ref ?–34)
BASOPHILS # BLD AUTO: 0.04 X10(3) UL (ref 0–0.2)
BASOPHILS NFR BLD AUTO: 0.7 %
BILIRUB SERPL-MCNC: 0.4 MG/DL (ref 0.3–1.2)
BUN BLD-MCNC: 8 MG/DL (ref 9–23)
CALCIUM BLD-MCNC: 9.7 MG/DL (ref 8.7–10.6)
CHLORIDE SERPL-SCNC: 106 MMOL/L (ref 98–112)
CO2 SERPL-SCNC: 27 MMOL/L (ref 21–32)
CREAT BLD-MCNC: 0.64 MG/DL
CRP SERPL-MCNC: <0.4 MG/DL (ref ?–0.5)
DEPRECATED HBV CORE AB SER IA-ACNC: 308 NG/ML
EGFRCR SERPLBLD CKD-EPI 2021: 130 ML/MIN/1.73M2 (ref 60–?)
EOSINOPHIL # BLD AUTO: 0.32 X10(3) UL (ref 0–0.7)
EOSINOPHIL NFR BLD AUTO: 5.3 %
ERYTHROCYTE [DISTWIDTH] IN BLOOD BY AUTOMATED COUNT: 12.1 %
ERYTHROCYTE [SEDIMENTATION RATE] IN BLOOD: 6 MM/HR
FASTING STATUS PATIENT QL REPORTED: YES
FOLATE SERPL-MCNC: >48 NG/ML (ref 5.4–?)
GLOBULIN PLAS-MCNC: 2.1 G/DL (ref 2–3.5)
GLUCOSE BLD-MCNC: 86 MG/DL (ref 70–99)
HCT VFR BLD AUTO: 36.3 %
HGB BLD-MCNC: 12.3 G/DL
IMM GRANULOCYTES # BLD AUTO: 0.02 X10(3) UL (ref 0–1)
IMM GRANULOCYTES NFR BLD: 0.3 %
IRON SATN MFR SERPL: 46 %
IRON SERPL-MCNC: 164 UG/DL
LYMPHOCYTES # BLD AUTO: 1.59 X10(3) UL (ref 1–4)
LYMPHOCYTES NFR BLD AUTO: 26.2 %
MCH RBC QN AUTO: 30.4 PG (ref 26–34)
MCHC RBC AUTO-ENTMCNC: 33.9 G/DL (ref 31–37)
MCV RBC AUTO: 89.9 FL
MONOCYTES # BLD AUTO: 0.34 X10(3) UL (ref 0.1–1)
MONOCYTES NFR BLD AUTO: 5.6 %
NEUTROPHILS # BLD AUTO: 3.76 X10 (3) UL (ref 1.5–7.7)
NEUTROPHILS # BLD AUTO: 3.76 X10(3) UL (ref 1.5–7.7)
NEUTROPHILS NFR BLD AUTO: 61.9 %
OSMOLALITY SERPL CALC.SUM OF ELEC: 284 MOSM/KG (ref 275–295)
PLATELET # BLD AUTO: 279 10(3)UL (ref 150–450)
POTASSIUM SERPL-SCNC: 4.4 MMOL/L (ref 3.5–5.1)
PROT SERPL-MCNC: 6.5 G/DL (ref 5.7–8.2)
RBC # BLD AUTO: 4.04 X10(6)UL
SODIUM SERPL-SCNC: 138 MMOL/L (ref 136–145)
TOTAL IRON BINDING CAPACITY: 355 UG/DL (ref 250–425)
TRANSFERRIN SERPL-MCNC: 292 MG/DL (ref 250–380)
TSI SER-ACNC: 2.19 UIU/ML (ref 0.55–4.78)
VIT B12 SERPL-MCNC: 392 PG/ML (ref 211–911)
WBC # BLD AUTO: 6.1 X10(3) UL (ref 4–11)

## 2025-01-15 PROCEDURE — G2211 COMPLEX E/M VISIT ADD ON: HCPCS | Performed by: PHYSICIAN ASSISTANT

## 2025-01-15 PROCEDURE — 36415 COLL VENOUS BLD VENIPUNCTURE: CPT

## 2025-01-15 PROCEDURE — 85652 RBC SED RATE AUTOMATED: CPT

## 2025-01-15 PROCEDURE — 80053 COMPREHEN METABOLIC PANEL: CPT

## 2025-01-15 PROCEDURE — 85025 COMPLETE CBC W/AUTO DIFF WBC: CPT

## 2025-01-15 PROCEDURE — 82607 VITAMIN B-12: CPT

## 2025-01-15 PROCEDURE — 3008F BODY MASS INDEX DOCD: CPT | Performed by: PHYSICIAN ASSISTANT

## 2025-01-15 PROCEDURE — 3074F SYST BP LT 130 MM HG: CPT | Performed by: PHYSICIAN ASSISTANT

## 2025-01-15 PROCEDURE — 86038 ANTINUCLEAR ANTIBODIES: CPT

## 2025-01-15 PROCEDURE — 82728 ASSAY OF FERRITIN: CPT

## 2025-01-15 PROCEDURE — 83550 IRON BINDING TEST: CPT

## 2025-01-15 PROCEDURE — 99214 OFFICE O/P EST MOD 30 MIN: CPT | Performed by: PHYSICIAN ASSISTANT

## 2025-01-15 PROCEDURE — 86003 ALLG SPEC IGE CRUDE XTRC EA: CPT

## 2025-01-15 PROCEDURE — 82785 ASSAY OF IGE: CPT

## 2025-01-15 PROCEDURE — 83540 ASSAY OF IRON: CPT

## 2025-01-15 PROCEDURE — 90471 IMMUNIZATION ADMIN: CPT | Performed by: PHYSICIAN ASSISTANT

## 2025-01-15 PROCEDURE — 82746 ASSAY OF FOLIC ACID SERUM: CPT

## 2025-01-15 PROCEDURE — 86225 DNA ANTIBODY NATIVE: CPT

## 2025-01-15 PROCEDURE — 3078F DIAST BP <80 MM HG: CPT | Performed by: PHYSICIAN ASSISTANT

## 2025-01-15 PROCEDURE — 86140 C-REACTIVE PROTEIN: CPT

## 2025-01-15 PROCEDURE — 84443 ASSAY THYROID STIM HORMONE: CPT

## 2025-01-15 PROCEDURE — 90651 9VHPV VACCINE 2/3 DOSE IM: CPT | Performed by: PHYSICIAN ASSISTANT

## 2025-01-15 NOTE — PATIENT INSTRUCTIONS
Complete labs   Call cardiology about midodrine dosing   Check to see if insurance will cover Georgina Cohn PA-C (functional med)

## 2025-01-15 NOTE — PROGRESS NOTES
Nury Ordonez is a 19 year old female.   Chief Complaint   Patient presents with    Follow - Up     EJ  13- Pt is here to f/u on being diagnosed with dyusatomia     HPI:    Pt presents with new diagnosis of dysautonomia. She has been seeing cardiologist through [facility] but will be seeing a POTS specialist through [facility] soon. She continues to have frequent near syncope and syncope.    She also feels her endometriosis and fibromyalgia symptoms have been worse lately.     Anxiety is stable. She is followed by psychiatry.       Allergies:  Allergies[1]   Current Meds:  Current Outpatient Medications   Medication Sig Dispense Refill    ubrogepant (UBRELVY) 50 MG Oral Tab Take one tablet at onset of migraine.  May take additional tablet in 2 hours if needed.  Do not exceed two tablets per 24 hour period. 10 tablet 2    pantoprazole 40 MG Oral Tab EC Take 1 tablet (40 mg total) by mouth every morning before breakfast. Take one tablet (40 mg total) by mouth once daily, 30 minutes prior to breakfast. 60 tablet 0    lamoTRIgine 25 MG Oral Tab 2 tabs(50mg) in am, 4 tabs (100mg) hs po 540 tablet 3    linaCLOtide (LINZESS) 72 MCG Oral Cap Take 72 mcg by mouth daily. 90 capsule 3    propranolol 20 MG Oral Tab Take 1 tablet (20 mg total) by mouth daily. 90 tablet 0    ORILISSA 150 MG Oral Tab TAKE 150 MG BY MOUTH DAILY FOR 30 DAYS.      loratadine 10 MG Oral Tab Take 1 tablet (10 mg total) by mouth daily.      traZODone 100 MG Oral Tab Take 1 tablet (100 mg total) by mouth nightly.      DULoxetine 60 MG Oral Cap DR Particles Take 1 capsule (60 mg total) by mouth daily.      Levonorgestrel (LILETTA, 52 MG,) 20.1 MCG/DAY Intrauterine IUD 1 each by Intrauterine route one time.      albuterol 108 (90 Base) MCG/ACT Inhalation Aero Soln Inhale 2 puffs into the lungs every 6 (six) hours as needed for Wheezing. inhale 2 puff by inhalation route  every 4 - 6 hours as needed 3 each 3    HYDROcodone-acetaminophen 5-325 MG Oral Tab  Take 1-2 tablets by mouth every 6 (six) hours as needed. 12 tablet 0    famotidine 40 MG Oral Tab Take 1 tablet (40 mg total) by mouth daily. 90 tablet 3    amphetamine-dextroamphetamine ER 10 MG Oral Capsule SR 24 Hr Take 1 capsule (10 mg total) by mouth every morning.      diazePAM 5 MG Oral Tab TAKE ONE TAB BY MOUTH 3 TIMES A DAY AS NEEDED FOR ANXIETY OR INSOMNIA      albuterol (PROAIR HFA) 108 (90 Base) MCG/ACT Inhalation Aero Soln Inhale 2 puffs into the lungs every 4 (four) hours as needed for Shortness of Breath. 1 each 1    DULOXETINE 30 MG Oral Cap DR Particles TAKE 1 CAPSULE BY MOUTH EVERY DAY 90 capsule 0    ondansetron 4 MG Oral Tablet Dispersible Take 1 tablet (4 mg total) by mouth every 6 (six) hours as needed.      Multiple Vitamins-Minerals (MULTI-VITAMIN/MINERALS) Oral Tab Take 1 tablet by mouth daily.      Norgestimate-Eth Estradiol 0.25-35 MG-MCG Oral Tab Take 1 tablet by mouth daily. Wendy          PMH:     Past Medical History:    Abdominal pain    Allergic rhinitis    Anemia    Anxiety    currently in OP anxiety program    Anxiety state    Asthma (HCC)    Back pain    Bloating    Blood in the stool    Constipation    Depression    Diarrhea, unspecified    Dizziness    Endometriosis    Fatigue    Feeling lonely    Fibromyalgia    Headache disorder    Heart palpitations    Heartburn    Heavy menses    Hemorrhoids    History of depression    Indigestion    Irregular bowel habits    Loss of appetite    Menses painful    Migraines    Nausea    Night sweats    Pain with bowel movements    Scoliosis    Shortness of breath    Sleep disturbance    Stress    Syncope    Uncomfortable fullness after meals    Visual impairment    Wears glasses       ROS:   GENERAL: Negative for fever, chills and fatigue. NAD.  HENT: Negative for congestion, sore throat, and ear pain.  RESPIRATORY: Negative for cough, chest tightness, shortness of breath and wheezing.    CV: +palpitations   GI: Negative for nausea, vomiting,  abdominal pain, diarrhea, and blood in stool.   : Negative for dysuria, hematuria and difficulty urinating.   MUSCULOSKELETAL: +chronic pain   NEURO: +syncope   PSYCH: The patient is not nervous/anxious. No depression.      PHYSICAL EXAM:    /64   Pulse 90   Temp 97.5 °F (36.4 °C) (Temporal)   Resp 18   Ht 5' 3\" (1.6 m)   Wt 119 lb 3.2 oz (54.1 kg)   LMP  (LMP Unknown)   SpO2 99%   BMI 21.12 kg/m²     GENERAL: NAD. A&Ox3  HEENT: Ear canals clear, TMs pearly gray bilaterally. Throat without erythema or exudates.  NECK: No LAD.   RESPIRATORY: CTAB, no R/R/W  CV: RRR, no murmurs.   ABD: Soft, non-tender, non-distended. +bowel sounds. No rebound tenderness.   NEURO: No focal deficits.   MUSCULOSKELETAL: Full ROM of all extremities. No swelling.   PSYCH: Appropriate mood and affect.      ASSESSMENT/ PLAN:   1. Dysautonomia (HCC)  - Connective Tissue Disease (JAGUAR) Screen, Reflex Specific Antibody; Future  - Sed Rate, Westergren (Automated) [E]; Future  - C-Reactive Protein [E]; Future  - TSH W Reflex To Free T4 [E]; Future  - Comp Metabolic Panel (14) [E]; Future  - Integrative Medicine Physician Consultation ([place]) - Internal Referral    2. Fibromyalgia  - Integrative Medicine Physician Consultation ([place]) - Internal Referral    3. Iron deficiency  - Integrative Medicine Physician Consultation ([place]) - Internal Referral    4. Gastroesophageal reflux disease, unspecified whether esophagitis present  - Integrative Medicine Physician Consultation ([place]) - Internal Referral    5. DAO (generalized anxiety disorder)  - Integrative Medicine Physician Consultation ([place]) - Internal Referral    6. Endometriosis  - Integrative Medicine Physician Consultation ([place]) - Internal Referral     Pt with a variety of chronic illnesses   Recommend seeing functional med for further eval of possible underlying etiology   See cardiology and POTS specialist as scheduled   Check additional labs today      Health Maintenance Due   Topic Date Due    HPV Vaccines (1 - 3-dose series) Never done    Meningococcal B Vaccine (1 of 2 - Standard) Never done    COVID-19 Vaccine (3 - 2024-25 season) 09/01/2024    Annual Depression Screening  01/01/2025    Annual Physical  03/11/2025           Pt indicates understanding and agrees to the plan.     No follow-ups on file.    Nhi Cabral PA-C           [1]   Allergies  Allergen Reactions    Red Dye HIVES and NAUSEA ONLY

## 2025-01-17 LAB
ALLERGEN BRAZIL NUT: <0.1 KUA/L (ref ?–0.1)
ALMOND IGE QN: <0.1 KUA/L (ref ?–0.1)
CASHEW NUT IGE QN: <0.1 KUA/L (ref ?–0.1)
CLAM IGE QN: <0.1 KUA/L (ref ?–0.1)
CODFISH IGE QN: <0.1 KUA/L (ref ?–0.1)
CORN IGE QN: <0.1 KUA/L (ref ?–0.1)
COW MILK IGE QN: <0.1 KUA/L (ref ?–0.1)
DSDNA IGG SERPL IA-ACNC: 3.6 IU/ML
EGG WHITE IGE QN: <0.1 KUA/L (ref ?–0.1)
ENA AB SER QL IA: 0.1 UG/L
ENA AB SER QL IA: NEGATIVE
GLUTEN IGE QN: <0.1 KUA/L (ref ?–0.1)
HAZELNUT IGE QN: <0.1 KUA/L (ref ?–0.1)
IGE SERPL-ACNC: 21.4 KU/L (ref 2–214)
PEANUT IGE QN: <0.1 KUA/L (ref ?–0.1)
SALMON IGE QN: <0.1 KUA/L (ref ?–0.1)
SCALLOP IGE QN: <0.1 KUA/L (ref ?–0.1)
SESAME SEED IGE QN: <0.1 KUA/L (ref ?–0.1)
SHRIMP IGE QN: <0.1 KUA/L (ref ?–0.1)
SOYBEAN IGE QN: <0.1 KUA/L (ref ?–0.1)
WALNUT IGE QN: <0.1 KUA/L (ref ?–0.1)
WHEAT IGE QN: <0.1 KUA/L (ref ?–0.1)

## 2025-01-19 PROCEDURE — 99284 EMERGENCY DEPT VISIT MOD MDM: CPT

## 2025-01-19 PROCEDURE — 99285 EMERGENCY DEPT VISIT HI MDM: CPT

## 2025-01-19 PROCEDURE — 36415 COLL VENOUS BLD VENIPUNCTURE: CPT

## 2025-01-20 ENCOUNTER — APPOINTMENT (OUTPATIENT)
Dept: CT IMAGING | Facility: HOSPITAL | Age: 20
End: 2025-01-20
Attending: EMERGENCY MEDICINE
Payer: COMMERCIAL

## 2025-01-20 ENCOUNTER — HOSPITAL ENCOUNTER (EMERGENCY)
Facility: HOSPITAL | Age: 20
Discharge: HOME | End: 2025-01-20
Attending: EMERGENCY MEDICINE
Payer: COMMERCIAL

## 2025-01-20 ENCOUNTER — TELEPHONE (OUTPATIENT)
Dept: NEUROLOGY | Facility: CLINIC | Age: 20
End: 2025-01-20

## 2025-01-20 VITALS
TEMPERATURE: 99 F | HEIGHT: 63 IN | WEIGHT: 112 LBS | HEART RATE: 76 BPM | BODY MASS INDEX: 19.84 KG/M2 | OXYGEN SATURATION: 97 % | SYSTOLIC BLOOD PRESSURE: 120 MMHG | RESPIRATION RATE: 19 BRPM | DIASTOLIC BLOOD PRESSURE: 82 MMHG

## 2025-01-20 DIAGNOSIS — G40.909 SEIZURE DISORDER (HCC): Primary | ICD-10-CM

## 2025-01-20 DIAGNOSIS — E87.6 HYPOKALEMIA: ICD-10-CM

## 2025-01-20 LAB
ALBUMIN SERPL-MCNC: 5 G/DL (ref 3.2–4.8)
ALBUMIN/GLOB SERPL: 1.9 {RATIO} (ref 1–2)
ALP LIVER SERPL-CCNC: 67 U/L
ALT SERPL-CCNC: 34 U/L
AMPHET UR QL SCN: NEGATIVE
ANION GAP SERPL CALC-SCNC: 9 MMOL/L (ref 0–18)
AST SERPL-CCNC: 25 U/L (ref ?–34)
BASOPHILS # BLD AUTO: 0.03 X10(3) UL (ref 0–0.2)
BASOPHILS NFR BLD AUTO: 0.4 %
BENZODIAZ UR QL SCN: NEGATIVE
BILIRUB SERPL-MCNC: 0.3 MG/DL (ref 0.3–1.2)
BUN BLD-MCNC: 8 MG/DL (ref 9–23)
CALCIUM BLD-MCNC: 10.1 MG/DL (ref 8.7–10.6)
CANNABINOIDS UR QL SCN: NEGATIVE
CHLORIDE SERPL-SCNC: 104 MMOL/L (ref 98–112)
CO2 SERPL-SCNC: 24 MMOL/L (ref 21–32)
COCAINE UR QL: NEGATIVE
CREAT BLD-MCNC: 0.7 MG/DL
CREAT UR-SCNC: 181.8 MG/DL
EGFRCR SERPLBLD CKD-EPI 2021: 127 ML/MIN/1.73M2 (ref 60–?)
EOSINOPHIL # BLD AUTO: 0.47 X10(3) UL (ref 0–0.7)
EOSINOPHIL NFR BLD AUTO: 5.6 %
ERYTHROCYTE [DISTWIDTH] IN BLOOD BY AUTOMATED COUNT: 12 %
FENTANYL UR QL SCN: NEGATIVE
GLOBULIN PLAS-MCNC: 2.7 G/DL (ref 2–3.5)
GLUCOSE BLD-MCNC: 84 MG/DL (ref 70–99)
HCG SERPL QL: NEGATIVE
HCT VFR BLD AUTO: 42.1 %
HGB BLD-MCNC: 14.9 G/DL
IMM GRANULOCYTES # BLD AUTO: 0.02 X10(3) UL (ref 0–1)
IMM GRANULOCYTES NFR BLD: 0.2 %
LYMPHOCYTES # BLD AUTO: 2.37 X10(3) UL (ref 1–4)
LYMPHOCYTES NFR BLD AUTO: 28.3 %
MCH RBC QN AUTO: 31 PG (ref 26–34)
MCHC RBC AUTO-ENTMCNC: 35.4 G/DL (ref 31–37)
MCV RBC AUTO: 87.7 FL
MDMA UR QL SCN: NEGATIVE
MONOCYTES # BLD AUTO: 0.54 X10(3) UL (ref 0.1–1)
MONOCYTES NFR BLD AUTO: 6.4 %
NEUTROPHILS # BLD AUTO: 4.95 X10 (3) UL (ref 1.5–7.7)
NEUTROPHILS # BLD AUTO: 4.95 X10(3) UL (ref 1.5–7.7)
NEUTROPHILS NFR BLD AUTO: 59.1 %
OPIATES UR QL SCN: NEGATIVE
OSMOLALITY SERPL CALC.SUM OF ELEC: 282 MOSM/KG (ref 275–295)
OXYCODONE UR QL SCN: NEGATIVE
PLATELET # BLD AUTO: 303 10(3)UL (ref 150–450)
POTASSIUM SERPL-SCNC: 3.4 MMOL/L (ref 3.5–5.1)
PROT SERPL-MCNC: 7.7 G/DL (ref 5.7–8.2)
RBC # BLD AUTO: 4.8 X10(6)UL
SODIUM SERPL-SCNC: 137 MMOL/L (ref 136–145)
WBC # BLD AUTO: 8.4 X10(3) UL (ref 4–11)

## 2025-01-20 PROCEDURE — 70450 CT HEAD/BRAIN W/O DYE: CPT | Performed by: EMERGENCY MEDICINE

## 2025-01-20 PROCEDURE — 85025 COMPLETE CBC W/AUTO DIFF WBC: CPT | Performed by: EMERGENCY MEDICINE

## 2025-01-20 PROCEDURE — 80053 COMPREHEN METABOLIC PANEL: CPT | Performed by: EMERGENCY MEDICINE

## 2025-01-20 PROCEDURE — 84703 CHORIONIC GONADOTROPIN ASSAY: CPT | Performed by: EMERGENCY MEDICINE

## 2025-01-20 PROCEDURE — 80307 DRUG TEST PRSMV CHEM ANLYZR: CPT | Performed by: EMERGENCY MEDICINE

## 2025-01-20 RX ORDER — POTASSIUM CHLORIDE 1500 MG/1
40 TABLET, EXTENDED RELEASE ORAL ONCE
Status: DISCONTINUED | OUTPATIENT
Start: 2025-01-20 | End: 2025-01-20

## 2025-01-20 NOTE — TELEPHONE ENCOUNTER
Patient was told she needs a stat order to do a eeg order and would like for a nurse to reach out to her

## 2025-01-20 NOTE — DISCHARGE INSTRUCTIONS
Suspect psychogenic nonepileptic seizure    Follow-up with Dr. Simpson for outpatient EEG    No driving until cleared by Dr. Simpson

## 2025-01-20 NOTE — ED INITIAL ASSESSMENT (HPI)
Complained of being tense/spasm, bodyaches & go \"unconscious\" for a short period then wakes up & cry.  Complained of \"not feeling good\".

## 2025-01-20 NOTE — ED PROVIDER NOTES
Patient Seen in: [facility] Emergency Department      History     Chief Complaint   Patient presents with    Seizure Disorder     Stated Complaint: Possible seizure but was conscious the entire time    Subjective:   HPI      Patient is a 19-year-old female presents to ED presents to ED for evaluation of possible seizure.  Patient states she experienced an episode where she felt out of it.  Patient states she had alternating periods of consciousness and unconsciousness.  Boyfriend who is at the bedside took a video that he showed me were her extremities would tighten up slightly shake and she would grimace and cry.  She would be intermittently awake and unconscious during this.  Episode lasted for about 20 or 25 minutes per the boyfriend.  Patient states she had a similar episode a couple months ago but did not seek medical attention.  She told her neurologist who she sees for chronic migraines about the episode and stated come back to the ER if she had another episode.  She denies family history of seizures.  Denies drinking or drug use.  No fever, vomiting.  No trauma.  Being evaluated for dysautonomia and supposed to have a tilt table test.    Objective:     Past Medical History:    Abdominal pain    Allergic rhinitis    Anemia    Anxiety    currently in OP anxiety program    Anxiety state    Asthma (HCC)    Back pain    Bloating    Blood in the stool    Constipation    Depression    Diarrhea, unspecified    Dizziness    Endometriosis    Fatigue    Feeling lonely    Fibromyalgia    Headache disorder    Heart palpitations    Heartburn    Heavy menses    Hemorrhoids    History of depression    Indigestion    Irregular bowel habits    Loss of appetite    Menses painful    Migraines    Nausea    Night sweats    Pain with bowel movements    Scoliosis    Shortness of breath    Sleep disturbance    Stress    Syncope    Uncomfortable fullness after meals    Visual impairment    Wears glasses              Past  Surgical History:   Procedure Laterality Date    Appendectomy      Appendectomy      Endometrial bx conjunct w/colposcopy      x2    Other surgical history      9/23 - Endometriosis excision laparoscopy 7/24 Endometriosis excision laparoscopy                Social History     Socioeconomic History    Marital status: Single   Tobacco Use    Smoking status: Never    Smokeless tobacco: Never   Vaping Use    Vaping status: Never Used   Substance and Sexual Activity    Alcohol use: Never    Drug use: Never   Other Topics Concern    Caffeine Concern No    Exercise No     Social Drivers of Health     Food Insecurity: No Food Insecurity (1/15/2025)    NCSS - Food Insecurity     Worried About Running Out of Food in the Last Year: No     Ran Out of Food in the Last Year: No   Transportation Needs: No Transportation Needs (1/15/2025)    NCSS - Transportation     Lack of Transportation: No   Housing Stability: Not At Risk (1/15/2025)    NCSS - Housing/Utilities     Has Housing: Yes     Worried About Losing Housing: No     Unable to Get Utilities: No                  Physical Exam     ED Triage Vitals [01/19/25 2348]   /87   Pulse 105   Resp 18   Temp 98.6 °F (37 °C)   Temp src Temporal   SpO2 99 %   O2 Device None (Room air)       Current Vitals:   Vital Signs  BP: 120/82  Pulse: 76  Resp: 19  Temp: 98.6 °F (37 °C)  Temp src: Temporal  MAP (mmHg): 93    Oxygen Therapy  SpO2: 97 %  O2 Device: None (Room air)        Physical Exam  GENERAL: No acute distress, well appearing and non-toxic, Alert and oriented X 3   HEENT: Normocephalic, atraumatic.  Moist mucous membranes.  Pupils equal round reactive to light and accommodation, extraocular motion is intact, sclerae white, conjunctiva is pink.  Oropharynx is unremarkable, no exudate.  NECK: Supple, trachea midline, no lymphadenopathy.   LUNG: Lungs clear to auscultation bilaterally, no wheezing, no rales, no rhonchi.  CARDIOVASCULAR: Regular rate and rhythm.  Normal S1S2.  No  S3S4 or murmur.  ABDOMEN: Bowel sounds are present. Soft. nondistended, no pulsatile masses. nontender  MUSCULOSKELETAL: No calf tenderness.  Dorsalis and Posterior Tibial pulses present. No clubbing. No cyanosis.  No edema.   SKIN EXAMINATIoN: Warm and dry with normal appearance.  No rashes or lesions.  NEUROLOGICAL:  Motor strength intact all groups.  normal sensation, speech intact    ED Course     Labs Reviewed   COMP METABOLIC PANEL (14) - Abnormal; Notable for the following components:       Result Value    Potassium 3.4 (*)     BUN 8 (*)     Albumin 5.0 (*)     All other components within normal limits   HCG, BETA SUBUNIT, QUAL - Normal   CBC WITH DIFFERENTIAL WITH PLATELET   DRUG SCREEN 8 W/OUT CONFIRMATION, URINE    Narrative:     Results of the Urine Drug Screen should be used only for medical purposes.   RAINBOW DRAW LAVENDER   RAINBOW DRAW LIGHT GREEN   RAINBOW DRAW BLUE   RAINBOW DRAW GOLD            I personally reviewd CT images of head and independent interpretation shows no acute bleed.  I also viewed formal radiology report as read by radiology with findings below:    CT of head read by vision rad radiology shows no acute bleed    Medications   potassium chloride (Klor-Con M20) tab 40 mEq (40 mEq Oral Not Given 1/20/25 0248)          MDM      Patient is a 19-year-old female presents to ED for evaluation potential seizure.  Differential includes seizure, psychogenic nonepileptic seizure.  Reviewed video from boyfriend that he took during episode and it appeared to be psychogenic in nature.  Laboratory test were performed showing potassium of 3.4.  Given oral potassium supplementation.  Screening head CT performed which was negative.  Spoke with neurology and they do not recommend antiepileptics at this time.  Patient should follow-up with neurologist for outpatient EEG.  Recommend driving restrictions until cleared by neurology.      External and old record review was performed.  I reviewed normal  MRI brain/MRA head and neck and December 2023 was normal    Independent source(s) of information include boyfriend  Medical Decision Making      Disposition and Plan     Clinical Impression:  1. Seizure disorder (HCC)    2. Hypokalemia         Disposition:  Discharge  1/20/2025  3:45 am    Follow-up:  Basilio Abdalla MD  [address] [facility] [address]  [place] [address]  [phone]    Follow up  As needed    Saulo Simpson MD  [address] [facility] [address]  [facility] [address] [phone]    Follow up in 2 day(s)            Medications Prescribed:  Discharge Medication List as of 1/20/2025  3:49 AM              Supplementary Documentation:

## 2025-01-20 NOTE — TELEPHONE ENCOUNTER
ER visit today 01/20/2024    Spoke with neurology and they do not recommend antiepileptics at this time. Patient should follow-up with neurologist for outpatient EEG. Recommend driving restrictions until cleared by neurology.

## 2025-01-24 ENCOUNTER — OFFICE VISIT (OUTPATIENT)
Dept: INTERNAL MEDICINE CLINIC | Facility: CLINIC | Age: 20
End: 2025-01-24
Payer: COMMERCIAL

## 2025-01-24 ENCOUNTER — NURSE ONLY (OUTPATIENT)
Dept: ELECTROPHYSIOLOGY | Facility: HOSPITAL | Age: 20
End: 2025-01-24
Attending: Other
Payer: COMMERCIAL

## 2025-01-24 VITALS
SYSTOLIC BLOOD PRESSURE: 108 MMHG | BODY MASS INDEX: 20.8 KG/M2 | DIASTOLIC BLOOD PRESSURE: 64 MMHG | TEMPERATURE: 98 F | HEART RATE: 83 BPM | RESPIRATION RATE: 18 BRPM | OXYGEN SATURATION: 99 % | HEIGHT: 63 IN | WEIGHT: 117.38 LBS

## 2025-01-24 DIAGNOSIS — G90.1 DYSAUTONOMIA (HCC): Primary | ICD-10-CM

## 2025-01-24 DIAGNOSIS — R56.9 SEIZURE-LIKE ACTIVITY (HCC): ICD-10-CM

## 2025-01-24 DIAGNOSIS — F41.1 GAD (GENERALIZED ANXIETY DISORDER): ICD-10-CM

## 2025-01-24 DIAGNOSIS — N80.9 ENDOMETRIOSIS: ICD-10-CM

## 2025-01-24 DIAGNOSIS — R55 SYNCOPE, UNSPECIFIED SYNCOPE TYPE: ICD-10-CM

## 2025-01-24 DIAGNOSIS — G40.909 SEIZURE DISORDER (HCC): ICD-10-CM

## 2025-01-24 PROCEDURE — 3074F SYST BP LT 130 MM HG: CPT | Performed by: PHYSICIAN ASSISTANT

## 2025-01-24 PROCEDURE — 95816 EEG AWAKE AND DROWSY: CPT

## 2025-01-24 PROCEDURE — 3008F BODY MASS INDEX DOCD: CPT | Performed by: PHYSICIAN ASSISTANT

## 2025-01-24 PROCEDURE — 3078F DIAST BP <80 MM HG: CPT | Performed by: PHYSICIAN ASSISTANT

## 2025-01-24 PROCEDURE — G2211 COMPLEX E/M VISIT ADD ON: HCPCS | Performed by: PHYSICIAN ASSISTANT

## 2025-01-24 PROCEDURE — 99214 OFFICE O/P EST MOD 30 MIN: CPT | Performed by: PHYSICIAN ASSISTANT

## 2025-01-24 RX ORDER — MIDODRINE HYDROCHLORIDE 2.5 MG/1
2.5 TABLET ORAL 2 TIMES DAILY
COMMUNITY
Start: 2024-12-30

## 2025-01-24 RX ORDER — PROPRANOLOL HYDROCHLORIDE 10 MG/1
10 TABLET ORAL DAILY
Qty: 30 TABLET | Refills: 1 | Status: SHIPPED | OUTPATIENT
Start: 2025-01-24

## 2025-01-24 NOTE — PROGRESS NOTES
Nury Ordonez is a 19 year old female.   Chief Complaint   Patient presents with    Hospital F/U     EJ Rm 11- Pt is here for a hosp f/u     HPI:    Pt presents for ER f/u.   Seen in the ER for possible seizure activity. She has an EEG scheduled for today and has f/u with her neurologist for the end of February. Most recent episode was witnessed by the pt's mom and boyfriend. She was semi-conscious with intermittent jerking and tightening of muscles.   This same issue has happened in the past as well but is different than her typical syncopal episodes.   She does experience a prodrome approx 10 minutes prior to both the seizure like activity and the syncopal episodes. She is currently taking propranolol for migraines and possible POTS. She also takes midodrine for the hypotension caused by the propranolol. She does not fell either of these meds are helpful and would like to stop if possible. Her mom worries that she is taking too many medications and that this may be contributing to her symptoms.     She has known anxiety and follows with both a psychiatrist and a therapist. She has been seeing psych for the past 7 years. At one point she was diagnosed with bipolar but then this diagnosis was found to not be accurate. She also has ADHD.     She has endometriosis. Recently saw an endo specialist through . She was taken off of her ocp and is taking orilissa. She has never been diagnosed with PCOS but has a h/o ovarian cysts and anovulation.        0Allergies:  Allergies[1]   Current Meds:  Current Outpatient Medications   Medication Sig Dispense Refill    midodrine 2.5 MG Oral Tab Take 1 tablet (2.5 mg total) by mouth 2 (two) times daily.      propranolol 10 MG Oral Tab Take 1 tablet (10 mg total) by mouth daily. 30 tablet 1    ubrogepant (UBRELVY) 50 MG Oral Tab Take one tablet at onset of migraine.  May take additional tablet in 2 hours if needed.  Do not exceed two tablets per 24 hour period. 10 tablet 2     pantoprazole 40 MG Oral Tab EC Take 1 tablet (40 mg total) by mouth every morning before breakfast. Take one tablet (40 mg total) by mouth once daily, 30 minutes prior to breakfast. 60 tablet 0    lamoTRIgine 25 MG Oral Tab 2 tabs(50mg) in am, 4 tabs (100mg) hs po 540 tablet 3    linaCLOtide (LINZESS) 72 MCG Oral Cap Take 72 mcg by mouth daily. 90 capsule 3    ORILISSA 150 MG Oral Tab TAKE 150 MG BY MOUTH DAILY FOR 30 DAYS.      loratadine 10 MG Oral Tab Take 1 tablet (10 mg total) by mouth daily.      traZODone 100 MG Oral Tab Take 1 tablet (100 mg total) by mouth nightly.      DULoxetine 60 MG Oral Cap DR Particles Take 1 capsule (60 mg total) by mouth daily.      Levonorgestrel (LILETTA, 52 MG,) 20.1 MCG/DAY Intrauterine IUD 1 each by Intrauterine route one time.      albuterol 108 (90 Base) MCG/ACT Inhalation Aero Soln Inhale 2 puffs into the lungs every 6 (six) hours as needed for Wheezing. inhale 2 puff by inhalation route  every 4 - 6 hours as needed 3 each 3    HYDROcodone-acetaminophen 5-325 MG Oral Tab Take 1-2 tablets by mouth every 6 (six) hours as needed. 12 tablet 0    famotidine 40 MG Oral Tab Take 1 tablet (40 mg total) by mouth daily. 90 tablet 3    amphetamine-dextroamphetamine ER 10 MG Oral Capsule SR 24 Hr Take 1 capsule (10 mg total) by mouth every morning.      diazePAM 5 MG Oral Tab TAKE ONE TAB BY MOUTH 3 TIMES A DAY AS NEEDED FOR ANXIETY OR INSOMNIA      albuterol (PROAIR HFA) 108 (90 Base) MCG/ACT Inhalation Aero Soln Inhale 2 puffs into the lungs every 4 (four) hours as needed for Shortness of Breath. 1 each 1    DULOXETINE 30 MG Oral Cap DR Particles TAKE 1 CAPSULE BY MOUTH EVERY DAY 90 capsule 0    ondansetron 4 MG Oral Tablet Dispersible Take 1 tablet (4 mg total) by mouth every 6 (six) hours as needed.      Multiple Vitamins-Minerals (MULTI-VITAMIN/MINERALS) Oral Tab Take 1 tablet by mouth daily.      Norgestimate-Eth Estradiol 0.25-35 MG-MCG Oral Tab Take 1 tablet by mouth daily.  Wendy          PMH:     Past Medical History:    Abdominal pain    Allergic rhinitis    Anemia    Anxiety    currently in OP anxiety program    Anxiety state    Asthma (HCC)    Back pain    Bloating    Blood in the stool    Constipation    Depression    Diarrhea, unspecified    Dizziness    Endometriosis    Fatigue    Feeling lonely    Fibromyalgia    Headache disorder    Heart palpitations    Heartburn    Heavy menses    Hemorrhoids    History of depression    Indigestion    Irregular bowel habits    Loss of appetite    Menses painful    Migraines    Nausea    Night sweats    Pain with bowel movements    Scoliosis    Shortness of breath    Sleep disturbance    Stress    Syncope    Uncomfortable fullness after meals    Visual impairment    Wears glasses       ROS:   GENERAL: Negative for fever, chills and fatigue. NAD.  HENT: Negative for congestion, sore throat, and ear pain.  RESPIRATORY: Negative for cough, chest tightness, shortness of breath and wheezing.    CV: Negative for chest pain, palpitations and leg swelling.   GI: Negative for nausea, vomiting, abdominal pain, diarrhea, and blood in stool.   : Negative for dysuria, hematuria and difficulty urinating.   MUSCULOSKELETAL: Negative for myalgias, back pain, joint swelling, arthralgias and gait problem.   NEURO: Negative for dizziness, weakness, numbness, tingling and headaches. +syncope   PSYCH: +anxiety       PHYSICAL EXAM:    /64   Pulse 83   Temp 97.8 °F (36.6 °C) (Temporal)   Resp 18   Ht 5' 3\" (1.6 m)   Wt 117 lb 6.4 oz (53.3 kg)   LMP  (LMP Unknown)   SpO2 99%   BMI 20.80 kg/m²     GENERAL: NAD. A&Ox3  RESPIRATORY: CTAB, no R/R/W  CV: RRR, no murmurs.   NEURO: No focal deficits.   PSYCH: Appropriate mood and affect.      ASSESSMENT/ PLAN:   1. Dysautonomia (HCC)  Has upcoming appt with POTS specialist at [facility]   Discussed weaning off of propranolol and midodrine as she has not found meds to be helpful and she would like to minimize  meds     2. DAO (generalized anxiety disorder)  Recommend second opinion from a different psychiatrist and to discuss possible relationship between somatic symptoms and mental health - info given for [facility]   Also discussed possible genesight testing     3. Endometriosis  Per gyne     4. Syncope, unspecified syncope type  See POTS specialist as above     5. Seizure-like activity (HCC)  Complete EEG and f/u with neuro        Health Maintenance Due   Topic Date Due    Meningococcal B Vaccine (1 of 2 - Standard) Never done    COVID-19 Vaccine (3 - 2024-25 season) 09/01/2024    Annual Physical  03/11/2025           Pt indicates understanding and agrees to the plan.     Return in about 6 weeks (around 3/7/2025).    Nhi Cabral PA-C           [1]   Allergies  Allergen Reactions    Red Dye HIVES and NAUSEA ONLY

## 2025-01-27 NOTE — PROCEDURES
Date of Procedure: 1/24/2025    Procedure: EEG (ELECTROENCEPHALOGRAM)     HISTORY:19 YEAR OLD FEMALE SEEN AT THE EMERGENCY DEPARTMENT 01/19/25 FOR AN EPISODE THAT WAS POSSIBLY A SEIZURE LASTING APPROXIMATELY 20-25 MINUTES..  THE PATIENT REPORTS SLIPPING IN AND OUT OF CONSCIOUSNESS DURING THE EPISODE. A VIDEO OF THE EPISODE, TAKEN BY HER BOYFRIEND, SHOWED HER EXTREMITIES TIGHTENING UP AND SHAKE SLIGHTLY WHILE SHE GRIMACED AND CRIED. THE PATIENT REPORTS SHE HAD A SIMILAR EPISODE A MONTH PRIOR, BUT DID NOT SEEK MEDICAL ATTENTION.  PAST MEDICAL HISTORY:DEPRESSION, MIGRAINES, SCOLIOSIS, VISUAL IMPAIRMENT  MEDICATIONS:ORLISSA, UBRELVY, LAMOTRIGINE, TRAZADONE, PROPRANOLOL  PREVIOUS EEG:NONE  PATIENT STATE:ALERT AND ORIENTED TIMES 4  PATIENT STATE DURING EEG:AWAKE AND DROWSY    BACKGROUND ACTIVITY: Posterior rhythm was in the range of 7-9 Hz, reactive to eye opening; symmetrical and synchronous. Noted also are generalized  intermittent slowing of background activity. Drowsiness is characterized by intermittent theta waves bitemporally.    EPILEPTIFORM DISCHARGES: There were no epileptiform discharges recorded throughout the recording.   HYPERVENTILATION: Hyperventilation was performed with no change.  PHOTIC STIMULATION: Photic stimulation was performed with no change.   Stage II sleep was not reached.    IMPRESSION: There were no electroclinical seizure or epileptiform discharges captured. However, this does not rule out seizure disorder. Clinical correlation is advised.    Saulo Simpson MD (Michael)   Neurology  [facility]  1/27/2025, 10:40 AM  CC: Basilio Abdalla MD

## 2025-01-30 ENCOUNTER — TELEPHONE (OUTPATIENT)
Dept: INTERNAL MEDICINE CLINIC | Facility: CLINIC | Age: 20
End: 2025-01-30

## 2025-02-18 NOTE — TELEPHONE ENCOUNTER
Nury Ordonez requesting Medication Refill for:    Medication name and dose (copy and paste from medication list):     Medication Quantity Refills Start End   propranolol 10 MG Oral Tab 30 tablet 1 1/24/2025 --   Sig:   Take 1 tablet (10 mg total) by mouth daily.     Route:   Oral     Order #:   [id number]         If medication is not on medication list - transfer patient to RN queue for triage    Preferred Pharmacy:     [facility]/pharmacy #[id number] - [address] [phone], [phone]   [address] [place] [id number]   Phone: [phone] Fax: [phone]   Hours: Not open 24 hours     Request 90 day supply   LOV: 1/24/2025   Last Refill date: 1/24/25  Next Scheduled appointment:   Future Appointments   Date Time Provider Department Center   2/27/2025  3:40 PM Saulo Simpson MD [facility] [address] [facility] [address]   4/4/2025  2:20 PM Torito Diaz MD [facility] SUB GI   6/11/2025  7:30 AM Georgina Cohn PA-C [facility] [place]

## 2025-02-24 NOTE — TELEPHONE ENCOUNTER
Refill passed [facility] protocol. Please review order due to high drug interaction.     HighDrug-Drug: albuterol and propranololPharmacologic effects of beta-2 agonists (eg, Beta-2 Agonists) may be decreased by beta-adrenergic blockers (eg, Beta Blockers). Untoward physiologic effects, characterized by bronchospasm, may occur.  Requested Prescriptions     Pending Prescriptions Disp Refills    propranolol 10 MG Oral Tab 90 tablet 0     Sig: Take 1 tablet (10 mg total) by mouth daily.

## 2025-02-25 RX ORDER — PROPRANOLOL HYDROCHLORIDE 10 MG/1
10 TABLET ORAL DAILY
Qty: 90 TABLET | Refills: 0 | Status: SHIPPED | OUTPATIENT
Start: 2025-02-25 | End: 2025-02-27 | Stop reason: ALTCHOICE

## 2025-02-27 ENCOUNTER — OFFICE VISIT (OUTPATIENT)
Dept: NEUROLOGY | Facility: CLINIC | Age: 20
End: 2025-02-27
Payer: COMMERCIAL

## 2025-02-27 VITALS
DIASTOLIC BLOOD PRESSURE: 74 MMHG | SYSTOLIC BLOOD PRESSURE: 120 MMHG | WEIGHT: 112 LBS | HEART RATE: 104 BPM | RESPIRATION RATE: 18 BRPM | BODY MASS INDEX: 20 KG/M2

## 2025-02-27 DIAGNOSIS — G43.709 CHRONIC MIGRAINE W/O AURA W/O STATUS MIGRAINOSUS, NOT INTRACTABLE: ICD-10-CM

## 2025-02-27 DIAGNOSIS — G40.909 SEIZURE DISORDER (HCC): Primary | ICD-10-CM

## 2025-02-27 PROCEDURE — 3078F DIAST BP <80 MM HG: CPT | Performed by: OTHER

## 2025-02-27 PROCEDURE — 99215 OFFICE O/P EST HI 40 MIN: CPT | Performed by: OTHER

## 2025-02-27 PROCEDURE — 3074F SYST BP LT 130 MM HG: CPT | Performed by: OTHER

## 2025-02-27 RX ORDER — LEVETIRACETAM 500 MG/1
500 TABLET ORAL 2 TIMES DAILY
Qty: 60 TABLET | Refills: 3 | Status: SHIPPED | OUTPATIENT
Start: 2025-02-27

## 2025-02-27 RX ORDER — LAMOTRIGINE 50 MG/1
50 TABLET, EXTENDED RELEASE ORAL DAILY
COMMUNITY
Start: 2025-02-19

## 2025-02-27 NOTE — PROGRESS NOTES
[facility] Neurology Outpatient Progress Note  Date of service: 2/27/2025    Assessment:     ICD-10-CM    1. Seizure disorder (HCC)  G40.909       2. Chronic migraine w/o aura w/o status migrainosus, not intractable  G43.709       Some feature of POTS    Plan:  Started keppra, side effects reviewed  Refer to Dr Hancock, our epileptologist  Reviewed  MRI BRAIN MRA HEAD+MRA NECK , negative  NO Imitrex due to chest pain  Ubrelvy prn  PCP and Psychiatrist to follow re; anxiety  Seizure precaution reviewed; Pt should not drive, operate heavy machinery, swim, or climb ladders   See orders and medications filed with this encounter. The patient indicates understanding of these issues and agrees with the plan.  Discussed with patient regarding assessment, work up, care plan and possible adverse and side effects of the medications.  other options for migraine; qulipta, amovig  Pt should go ER for prolonged or recurrent seizure, any other new or worsening symptoms   A total of 40 minutes were spent on patient care,  more than 50% was spent counseling patient/family regarding studies' results, assessment, treatment options and care plan, 10 minutes were spent in (pre- and/or during visit) reviewing clinical data including imaging, labs and progress notes related to therapy or treatment.      Subjective:   History:  Patient here for a follow-up visit for seizure , she had 2 more seizures since last visit, mother showed me video, pt states she was upset prior to seizure, I initially was planning to increase lamictal gradually but pt does not like lamictal and states her psychiatrist is weaning off it, first body tensed up then shaking lasting 2-3 mins. No tongue biting or urinary incontinence. Routine eeg did not capture seizure or definitive epileptiform discharges.  Per initial visit:  Nury Ordonez is a 18 year old female with past medical history as listed below presents here for initial evaluation of worsening headache, she  states having headaches and migraines. Pt reports different type headaches, currently feels band around frontal area which is dfferent from typical migraine, her migraine starts in left eye, this persistent headache has started since early October and has not resolved, she tried and failed many OTC pain meds and norco, +family history of aneurysm, her grandfather had cerebral aneurysm.    History/Other:   REVIEW OF SYSTEMS:  A 10-point system was reviewed. Pertinent positives and negatives are noted as above       Current Outpatient Medications:     lamoTRIgine ER 50 MG Oral Tablet 24 Hr, Take 1 tablet (50 mg total) by mouth daily., Disp: , Rfl:     levETIRAcetam 500 MG Oral Tab, Take 1 tablet (500 mg total) by mouth 2 (two) times daily. Take 1 tab nightly x 3 days, then 1 tab bid po thereafter if tolerating, Disp: 60 tablet, Rfl: 3    ubrogepant (UBRELVY) 50 MG Oral Tab, Take one tablet at onset of migraine.  May take additional tablet in 2 hours if needed.  Do not exceed two tablets per 24 hour period., Disp: 10 tablet, Rfl: 2    linaCLOtide (LINZESS) 72 MCG Oral Cap, Take 72 mcg by mouth daily., Disp: 90 capsule, Rfl: 3    loratadine 10 MG Oral Tab, Take 1 tablet (10 mg total) by mouth daily., Disp: , Rfl:     traZODone 100 MG Oral Tab, Take 1 tablet (100 mg total) by mouth nightly., Disp: , Rfl:     DULoxetine 60 MG Oral Cap DR Particles, Take 1 capsule (60 mg total) by mouth daily., Disp: , Rfl:     Levonorgestrel (LILETTA, 52 MG,) 20.1 MCG/DAY Intrauterine IUD, 1 each by Intrauterine route one time., Disp: , Rfl:     albuterol 108 (90 Base) MCG/ACT Inhalation Aero Soln, Inhale 2 puffs into the lungs every 6 (six) hours as needed for Wheezing. inhale 2 puff by inhalation route  every 4 - 6 hours as needed, Disp: 3 each, Rfl: 3    HYDROcodone-acetaminophen 5-325 MG Oral Tab, Take 1-2 tablets by mouth every 6 (six) hours as needed., Disp: 12 tablet, Rfl: 0    famotidine 40 MG Oral Tab, Take 1 tablet (40 mg total)  by mouth daily., Disp: 90 tablet, Rfl: 3    amphetamine-dextroamphetamine ER 10 MG Oral Capsule SR 24 Hr, Take 1 capsule (10 mg total) by mouth every morning., Disp: , Rfl:     diazePAM 5 MG Oral Tab, TAKE ONE TAB BY MOUTH 3 TIMES A DAY AS NEEDED FOR ANXIETY OR INSOMNIA, Disp: , Rfl:     albuterol (PROAIR HFA) 108 (90 Base) MCG/ACT Inhalation Aero Soln, Inhale 2 puffs into the lungs every 4 (four) hours as needed for Shortness of Breath., Disp: 1 each, Rfl: 1    DULOXETINE 30 MG Oral Cap DR Particles, TAKE 1 CAPSULE BY MOUTH EVERY DAY, Disp: 90 capsule, Rfl: 0    ondansetron 4 MG Oral Tablet Dispersible, Take 1 tablet (4 mg total) by mouth every 6 (six) hours as needed., Disp: , Rfl:     Multiple Vitamins-Minerals (MULTI-VITAMIN/MINERALS) Oral Tab, Take 1 tablet by mouth daily., Disp: , Rfl:   Allergies:  Allergies[1]  Past Medical History:    Abdominal pain    Allergic rhinitis    Anemia    Anxiety    currently in OP anxiety program    Anxiety state    Asthma (HCC)    Back pain    Bloating    Blood in the stool    Constipation    Depression    Diarrhea, unspecified    Dizziness    Endometriosis    Fatigue    Feeling lonely    Fibromyalgia    Headache disorder    Heart palpitations    Heartburn    Heavy menses    Hemorrhoids    History of depression    Indigestion    Irregular bowel habits    Loss of appetite    Menses painful    Migraines    Nausea    Night sweats    Pain with bowel movements    Scoliosis    Shortness of breath    Sleep disturbance    Stress    Syncope    Uncomfortable fullness after meals    Visual impairment    Wears glasses     Past Surgical History:   Procedure Laterality Date    Appendectomy      Appendectomy      Endometrial bx conjunct w/colposcopy      x2    Other surgical history      9/23 - Endometriosis excision laparoscopy 7/24 Endometriosis excision laparoscopy     Social History:  Social History     Tobacco Use    Smoking status: Never    Smokeless tobacco: Never   Substance Use  Topics    Alcohol use: Never     Family History   Problem Relation Age of Onset    Ulcerative Colitis Father     Anemia Father     Anxiety Father     Other (Other) Father         UC    Asthma Father     Depression Father         Anxiety    Anxiety Mother     Depression Mother         Anxiety    Diabetes Maternal Grandfather     Heart Disease Maternal Grandfather     High Blood Pressure Maternal Grandfather     Hypertension Maternal Grandfather     Stroke Maternal Grandfather     Ovarian Cancer Paternal Grandmother     Cancer Paternal Grandmother         ovarian, omentum and liver    Anxiety Maternal Aunt     Bipolar Disorder Maternal Uncle     Bipolar Disorder Maternal Great-Grandmother       Objective:   Neurological Examination:  /74   Pulse 104   Resp 18   Wt 112 lb (50.8 kg)   LMP  (LMP Unknown)   BMI 19.84 kg/m²   Mental status: A & O X 3  Language: no aphasia  Speech: no dysarthria  CN II-XII: intact   Motor strength: 5/5 all extremities  Tone: normal  Coordination: normal  Sensory: symmetric  Gait: normal    Test reviewed on 2/27/2025      Saulo \"Sixto\" MD [facility]  Neurology  [facility]  2/27/2025, 4:08 PM  CC: Basilio Abdalla MD         [1]   Allergies  Allergen Reactions    Red Dye HIVES and NAUSEA ONLY    Ketorolac NAUSEA AND VOMITING

## 2025-02-27 NOTE — PATIENT INSTRUCTIONS
Refill policies:    Allow 2-3 business days for refills; controlled substances may take longer.  Contact your pharmacy at least 5 days prior to running out of medication and have them send an electronic request or submit request through the “request refill” option in your [employer] account.  Refills are not addressed on weekends; covering physicians do not authorize routine medications on weekends.  No narcotics or controlled substances are refilled after noon on Fridays or by on call physicians.  By law, narcotics must be electronically prescribed.  A 30 day supply with no refills is the maximum allowed.  If your prescription is due for a refill, you may be due for a follow up appointment.  To best provide you care, patients receiving routine medications need to be seen at least once a year.  Patients receiving narcotic/controlled substance medications need to be seen at least once every 3 months.  In the event that your preferred pharmacy does not have the requested medication in stock (e.g. Backordered), it is your responsibility to find another pharmacy that has the requested medication available.  We will gladly send a new prescription to that pharmacy at your request.    Scheduling Tests:    If your physician has ordered radiology tests such as MRI or CT scans, please contact Central Scheduling at [phone] right away to schedule the test.  Once scheduled, the [facility] Centralized Referral Team will work with your insurance carrier to obtain pre-certification or prior authorization.  Depending on your insurance carrier, approval may take 3-10 days.  It is highly recommended patients assure they have received an authorization before having a test performed.  If test is done without insurance authorization, patient may be responsible for the entire amount billed.      Precertification and Prior Authorizations:  If your physician has recommended that you have a procedure or additional testing performed the [facility]  Centralized Referral Team will contact your insurance carrier to obtain pre-certification or prior authorization.    You are strongly encouraged to contact your insurance carrier to verify that your procedure/test has been approved and is a COVERED benefit.  Although the [facility] Centralized Referral Team does its due diligence, the insurance carrier gives the disclaimer that \"Although the procedure is authorized, this does not guarantee payment.\"    Ultimately the patient is responsible for payment.   Thank you for your understanding in this matter.  Paperwork Completion:  If you require FMLA or disability paperwork for your recovery, please make sure to either drop it off or have it faxed to our office at [phone]. Be sure the form has your name and date of birth on it.  The form will be faxed to our Forms Department and they will complete it for you.  There is a 25$ fee for all forms that need to be filled out.  Please be aware there is a 10-14 day turnaround time.  You will need to sign a release of information (RACHAEL) form if your paperwork does not come with one.  You may call the Forms Department with any questions at [phone].  Their fax number is [phone].

## 2025-03-27 ENCOUNTER — OFFICE VISIT (OUTPATIENT)
Dept: NEUROLOGY | Facility: CLINIC | Age: 20
End: 2025-03-27
Payer: COMMERCIAL

## 2025-03-27 VITALS
DIASTOLIC BLOOD PRESSURE: 80 MMHG | OXYGEN SATURATION: 99 % | BODY MASS INDEX: 20.2 KG/M2 | HEIGHT: 63 IN | RESPIRATION RATE: 16 BRPM | WEIGHT: 114 LBS | SYSTOLIC BLOOD PRESSURE: 124 MMHG | HEART RATE: 107 BPM

## 2025-03-27 DIAGNOSIS — R56.9 CONVULSIONS, UNSPECIFIED CONVULSION TYPE (HCC): Primary | ICD-10-CM

## 2025-03-27 PROCEDURE — 3074F SYST BP LT 130 MM HG: CPT | Performed by: OTHER

## 2025-03-27 PROCEDURE — 3008F BODY MASS INDEX DOCD: CPT | Performed by: OTHER

## 2025-03-27 PROCEDURE — 3079F DIAST BP 80-89 MM HG: CPT | Performed by: OTHER

## 2025-03-27 PROCEDURE — 99215 OFFICE O/P EST HI 40 MIN: CPT | Performed by: OTHER

## 2025-03-27 RX ORDER — RELUGOLIX, ESTRADIOL HEMIHYDRATE, AND NORETHINDRONE ACETATE 40; 1; .5 MG/1; MG/1; MG/1
1 TABLET, FILM COATED ORAL DAILY
COMMUNITY
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
## 2025-03-27 NOTE — PATIENT INSTRUCTIONS
Refill policies:    Allow 2-3 business days for refills; controlled substances may take longer.  Contact your pharmacy at least 5 days prior to running out of medication and have them send an electronic request or submit request through the “request refill” option in your [employer] account.  Refills are not addressed on weekends; covering physicians do not authorize routine medications on weekends.  No narcotics or controlled substances are refilled after noon on Fridays or by on call physicians.  By law, narcotics must be electronically prescribed.  A 30 day supply with no refills is the maximum allowed.  If your prescription is due for a refill, you may be due for a follow up appointment.  To best provide you care, patients receiving routine medications need to be seen at least once a year.  Patients receiving narcotic/controlled substance medications need to be seen at least once every 3 months.  In the event that your preferred pharmacy does not have the requested medication in stock (e.g. Backordered), it is your responsibility to find another pharmacy that has the requested medication available.  We will gladly send a new prescription to that pharmacy at your request.    Scheduling Tests:    If your physician has ordered radiology tests such as MRI or CT scans, please contact Central Scheduling at [phone] right away to schedule the test.  Once scheduled, the [facility] Centralized Referral Team will work with your insurance carrier to obtain pre-certification or prior authorization.  Depending on your insurance carrier, approval may take 3-10 days.  It is highly recommended patients assure they have received an authorization before having a test performed.  If test is done without insurance authorization, patient may be responsible for the entire amount billed.      Precertification and Prior Authorizations:  If your physician has recommended that you have a procedure or additional testing performed the [facility]  Centralized Referral Team will contact your insurance carrier to obtain pre-certification or prior authorization.    You are strongly encouraged to contact your insurance carrier to verify that your procedure/test has been approved and is a COVERED benefit.  Although the [facility] Centralized Referral Team does its due diligence, the insurance carrier gives the disclaimer that \"Although the procedure is authorized, this does not guarantee payment.\"    Ultimately the patient is responsible for payment.   Thank you for your understanding in this matter.  Paperwork Completion:  If you require FMLA or disability paperwork for your recovery, please make sure to either drop it off or have it faxed to our office at [phone]. Be sure the form has your name and date of birth on it.  The form will be faxed to our Forms Department and they will complete it for you.  There is a 25$ fee for all forms that need to be filled out.  Please be aware there is a 10-14 day turnaround time.  You will need to sign a release of information (RACHAEL) form if your paperwork does not come with one.  You may call the Forms Department with any questions at [phone].  Their fax number is [phone].

## 2025-03-29 ENCOUNTER — HOSPITAL ENCOUNTER (EMERGENCY)
Age: 20
Discharge: HOME | End: 2025-03-29
Attending: EMERGENCY MEDICINE
Payer: COMMERCIAL

## 2025-03-29 VITALS
HEART RATE: 85 BPM | WEIGHT: 113 LBS | OXYGEN SATURATION: 100 % | SYSTOLIC BLOOD PRESSURE: 117 MMHG | DIASTOLIC BLOOD PRESSURE: 72 MMHG | BODY MASS INDEX: 20 KG/M2 | TEMPERATURE: 98 F | RESPIRATION RATE: 18 BRPM

## 2025-03-29 DIAGNOSIS — G90.A POTS (POSTURAL ORTHOSTATIC TACHYCARDIA SYNDROME): Primary | ICD-10-CM

## 2025-03-29 LAB
ALBUMIN SERPL-MCNC: 5.3 G/DL (ref 3.2–4.8)
ALBUMIN/GLOB SERPL: 2.7 {RATIO} (ref 1–2)
ALP LIVER SERPL-CCNC: 72 U/L
ALT SERPL-CCNC: 29 U/L
ANION GAP SERPL CALC-SCNC: 7 MMOL/L (ref 0–18)
AST SERPL-CCNC: 20 U/L (ref ?–34)
BASOPHILS # BLD AUTO: 0.03 X10(3) UL (ref 0–0.2)
BASOPHILS NFR BLD AUTO: 0.7 %
BILIRUB SERPL-MCNC: 0.6 MG/DL (ref 0.3–1.2)
BUN BLD-MCNC: 8 MG/DL (ref 9–23)
CALCIUM BLD-MCNC: 10.1 MG/DL (ref 8.7–10.6)
CHLORIDE SERPL-SCNC: 106 MMOL/L (ref 98–112)
CO2 SERPL-SCNC: 26 MMOL/L (ref 21–32)
CREAT BLD-MCNC: 0.77 MG/DL
EGFRCR SERPLBLD CKD-EPI 2021: 113 ML/MIN/1.73M2 (ref 60–?)
EOSINOPHIL # BLD AUTO: 0.26 X10(3) UL (ref 0–0.7)
EOSINOPHIL NFR BLD AUTO: 5.7 %
ERYTHROCYTE [DISTWIDTH] IN BLOOD BY AUTOMATED COUNT: 12 %
GLOBULIN PLAS-MCNC: 2 G/DL (ref 2–3.5)
GLUCOSE BLD-MCNC: 90 MG/DL (ref 70–99)
HCT VFR BLD AUTO: 40.5 %
HGB BLD-MCNC: 14.8 G/DL
IMM GRANULOCYTES # BLD AUTO: 0.01 X10(3) UL (ref 0–1)
IMM GRANULOCYTES NFR BLD: 0.2 %
LYMPHOCYTES # BLD AUTO: 1.57 X10(3) UL (ref 1–4)
LYMPHOCYTES NFR BLD AUTO: 34.4 %
MAGNESIUM SERPL-MCNC: 2.1 MG/DL (ref 1.6–2.6)
MCH RBC QN AUTO: 32.2 PG (ref 26–34)
MCHC RBC AUTO-ENTMCNC: 36.5 G/DL (ref 31–37)
MCV RBC AUTO: 88 FL
MONOCYTES # BLD AUTO: 0.28 X10(3) UL (ref 0.1–1)
MONOCYTES NFR BLD AUTO: 6.1 %
NEUTROPHILS # BLD AUTO: 2.41 X10 (3) UL (ref 1.5–7.7)
NEUTROPHILS # BLD AUTO: 2.41 X10(3) UL (ref 1.5–7.7)
NEUTROPHILS NFR BLD AUTO: 52.9 %
OSMOLALITY SERPL CALC.SUM OF ELEC: 286 MOSM/KG (ref 275–295)
PLATELET # BLD AUTO: 220 10(3)UL (ref 150–450)
PLATELETS.RETICULATED NFR BLD AUTO: 4 % (ref 0–7)
POTASSIUM SERPL-SCNC: 3.8 MMOL/L (ref 3.5–5.1)
PROT SERPL-MCNC: 7.3 G/DL (ref 5.7–8.2)
RBC # BLD AUTO: 4.6 X10(6)UL
SODIUM SERPL-SCNC: 139 MMOL/L (ref 136–145)
TROPONIN I SERPL HS-MCNC: <3 NG/L
WBC # BLD AUTO: 4.6 X10(3) UL (ref 4–11)

## 2025-03-29 PROCEDURE — 85025 COMPLETE CBC W/AUTO DIFF WBC: CPT | Performed by: EMERGENCY MEDICINE

## 2025-03-29 PROCEDURE — 83735 ASSAY OF MAGNESIUM: CPT | Performed by: EMERGENCY MEDICINE

## 2025-03-29 PROCEDURE — 96361 HYDRATE IV INFUSION ADD-ON: CPT

## 2025-03-29 PROCEDURE — 99284 EMERGENCY DEPT VISIT MOD MDM: CPT

## 2025-03-29 PROCEDURE — 80053 COMPREHEN METABOLIC PANEL: CPT | Performed by: EMERGENCY MEDICINE

## 2025-03-29 PROCEDURE — 84484 ASSAY OF TROPONIN QUANT: CPT | Performed by: EMERGENCY MEDICINE

## 2025-03-29 PROCEDURE — 93010 ELECTROCARDIOGRAM REPORT: CPT

## 2025-03-29 PROCEDURE — 96374 THER/PROPH/DIAG INJ IV PUSH: CPT

## 2025-03-29 PROCEDURE — 93005 ELECTROCARDIOGRAM TRACING: CPT

## 2025-03-29 RX ORDER — MORPHINE SULFATE 4 MG/ML
2 INJECTION, SOLUTION INTRAMUSCULAR; INTRAVENOUS ONCE
Status: COMPLETED | OUTPATIENT
Start: 2025-03-29 | End: 2025-03-29

## 2025-03-29 NOTE — ED INITIAL ASSESSMENT (HPI)
Reports elevated heart rate today when waking up, states normally if she rests it gets better. Hx POTS

## 2025-03-29 NOTE — ED PROVIDER NOTES
Patient Seen in: [facility] In [place]      History     Chief Complaint   Patient presents with    Arrythmia/Palpitations     Stated Complaint: tachycardia, hx pots    Subjective:   The history is provided by the patient.         19-year-old female with history of POTS syndrome presenting to the ER with palpitations with associated dizziness, increasing in severity over the past few days.  Denies any recent illnesses, infections, rhinorrhea, nasal congestion, sore throat, nausea, vomiting, diarrhea.  She reports history of endometriosis and worsening uterine pain and cramping which is contributing to her worsening palpitations.    Objective:     Past Medical History:    Abdominal pain    Allergic rhinitis    Anemia    Anxiety    currently in OP anxiety program    Anxiety state    Asthma (HCC)    Back pain    Bloating    Blood in the stool    Constipation    Depression    Diarrhea, unspecified    Dizziness    Endometriosis    Fatigue    Feeling lonely    Fibromyalgia    Headache disorder    Heart palpitations    Heartburn    Heavy menses    Hemorrhoids    History of depression    Indigestion    Irregular bowel habits    Loss of appetite    Menses painful    Migraines    Nausea    Night sweats    Pain with bowel movements    Scoliosis    Shortness of breath    Sleep disturbance    Stress    Syncope    Uncomfortable fullness after meals    Visual impairment    Wears glasses              Past Surgical History:   Procedure Laterality Date    Appendectomy      Appendectomy      Endometrial bx conjunct w/colposcopy      x2    Other surgical history      9/23 - Endometriosis excision laparoscopy 7/24 Endometriosis excision laparoscopy                Social History     Socioeconomic History    Marital status: Single   Tobacco Use    Smoking status: Never    Smokeless tobacco: Never   Vaping Use    Vaping status: Never Used   Substance and Sexual Activity    Alcohol use: Never    Drug use: Never   Other  Topics Concern    Caffeine Concern No    Exercise No     Social Drivers of Health     Food Insecurity: No Food Insecurity (1/15/2025)    NCSS - Food Insecurity     Worried About Running Out of Food in the Last Year: No     Ran Out of Food in the Last Year: No   Transportation Needs: No Transportation Needs (1/15/2025)    NCSS - Transportation     Lack of Transportation: No   Housing Stability: Not At Risk (1/15/2025)    NCSS - Housing/Utilities     Has Housing: Yes     Worried About Losing Housing: No     Unable to Get Utilities: No                  Physical Exam     ED Triage Vitals [03/29/25 1106]   /83   Pulse 81   Resp 16   Temp 97.8 °F (36.6 °C)   Temp src Temporal   SpO2 99 %   O2 Device None (Room air)       Current Vitals:   Vital Signs  BP: 117/72  Pulse: 85  Resp: 18  Temp: 97.8 °F (36.6 °C)  Temp src: Temporal    Oxygen Therapy  SpO2: 100 %  O2 Device: None (Room air)        Physical Exam  Vitals and nursing note reviewed.   Constitutional:       General: She is not in acute distress.     Appearance: She is well-developed. She is not ill-appearing.   HENT:      Head: Normocephalic and atraumatic.   Eyes:      Extraocular Movements: Extraocular movements intact.      Pupils: Pupils are equal, round, and reactive to light.   Cardiovascular:      Rate and Rhythm: Regular rhythm. Tachycardia present.      Pulses: Normal pulses.      Heart sounds: Normal heart sounds.   Pulmonary:      Effort: Pulmonary effort is normal. No respiratory distress.      Breath sounds: Normal breath sounds.   Abdominal:      General: Abdomen is flat. There is no distension.      Palpations: Abdomen is soft.      Tenderness: There is no abdominal tenderness.   Musculoskeletal:      Cervical back: Neck supple.   Skin:     General: Skin is dry.   Neurological:      Mental Status: She is alert and oriented to person, place, and time.   Psychiatric:         Mood and Affect: Mood normal.         Behavior: Behavior normal.              ED Course     Labs Reviewed   COMP METABOLIC PANEL (14) - Abnormal; Notable for the following components:       Result Value    BUN 8 (*)     Albumin 5.3 (*)     A/G Ratio 2.7 (*)     All other components within normal limits   TROPONIN I HIGH SENSITIVITY - Normal   MAGNESIUM - Normal   CBC WITH DIFFERENTIAL WITH PLATELET              ED Course as of 03/29/25 2106  ------------------------------------------------------------  Time: 03/29 1408  Comment: Independently interpreted labs, electrolytes are within normal limits.  Troponin negative.  Unremarkable labs  ------------------------------------------------------------  Time: 03/29 1413  Comment: EKG INTERPRETATION  Time: 11:05 AM  Normal sinus rhythm, ventricular rate is 94  Normal axis  Normal IN, QRS, and QTc intervals  No chamber enlargement  Normal ST-T segments  I, the emergency physician, independently interpreted this EKG in the absence of a cardiologist.                MDM              Medical Decision Making  Problems Addressed:  POTS (postural orthostatic tachycardia syndrome): chronic illness or injury with exacerbation, progression, or side effects of treatment     Details: Exacerbation 2/2 to her endometriosis, pain controlled -> tachycardia/palpitaitons controlled, IVF given  Patient requesting betablocker, I advised cardiology follow up as she has had side effects to beta blockers in the past    Amount and/or Complexity of Data Reviewed  Labs: ordered. Decision-making details documented in ED Course.        Disposition and Plan     Clinical Impression:  1. POTS (postural orthostatic tachycardia syndrome)         Disposition:  Discharge  3/29/2025  2:35 pm    Follow-up:  Basilio Abdalla MD  [address] [place] [address] [id number] [phone]    Schedule an appointment as soon as possible for a visit  Contact your PCP or cardiologist as discussed          Medications Prescribed:  Discharge Medication List as of 3/29/2025  2:36 PM               Supplementary Documentation:

## 2025-03-30 LAB
ATRIAL RATE: 94 BPM
P AXIS: 61 DEGREES
P-R INTERVAL: 120 MS
Q-T INTERVAL: 352 MS
QRS DURATION: 88 MS
QTC CALCULATION (BEZET): 440 MS
R AXIS: 84 DEGREES
T AXIS: 3 DEGREES
VENTRICULAR RATE: 94 BPM

## 2025-06-02 RX ORDER — FAMOTIDINE 40 MG/1
40 TABLET, FILM COATED ORAL DAILY
Qty: 90 TABLET | Refills: 3 | Status: SHIPPED | OUTPATIENT
Start: 2025-06-02

## 2025-06-02 NOTE — TELEPHONE ENCOUNTER
REFILL PASSED PER [facility]     Please review pended refill request as unable to refill due to high/very high drug interaction warning copied here;          High  High Dose: famotidine, 40 mg, Oral, DailySingle dose of 40 mg exceeds recommended maximum of 20 mg by 100%  Details  Override reason

## (undated) DEVICE — SOLUTION ANTIFOG W/ ADH BK FOAM SPNG RADPQ

## (undated) DEVICE — PLUMEPORT ACTIV LAPAROSCOPIC SMOKE FILTRATION DEVICE: Brand: PLUMEPORT ACTIVE

## (undated) DEVICE — SLEEVE COMPR M KNEE LEN SGL USE KENDALL SCD

## (undated) DEVICE — SUTURE MCRYL SZ 4-0 L18IN ABSRB UD L19MM PS-2

## (undated) DEVICE — BAG DRNGE 2000ML URIN INF CTRL ANTI REFLX

## (undated) DEVICE — TROCAR: Brand: KII® SLEEVE

## (undated) DEVICE — STERILE POLYISOPRENE POWDER-FREE SURGICAL GLOVES: Brand: PROTEXIS

## (undated) DEVICE — INSUFFLATION NEEDLE TO ESTABLISH PNEUMOPERITONEUM.: Brand: INSUFFLATION NEEDLE

## (undated) DEVICE — ADHESIVE SKIN TOP FOR WND CLSR DERMBND ADV

## (undated) DEVICE — [HIGH FLOW INSUFFLATOR,  DO NOT USE IF PACKAGE IS DAMAGED,  KEEP DRY,  KEEP AWAY FROM SUNLIGHT,  PROTECT FROM HEAT AND RADIOACTIVE SOURCES.]: Brand: PNEUMOSURE

## (undated) DEVICE — GYN LAP/ROBOTIC: Brand: MEDLINE INDUSTRIES, INC.

## (undated) DEVICE — TROCAR: Brand: KII FIOS FIRST ENTRY

## (undated) DEVICE — HUNTER GASPER TIP, DISPOSABLE: Brand: RENEW

## (undated) DEVICE — 40580 - THE PINK PAD - ADVANCED TRENDELENBURG POSITIONING KIT: Brand: 40580 - THE PINK PAD - ADVANCED TRENDELENBURG POSITIONING KIT

## (undated) DEVICE — SOLUTION IRRIG 1000ML 0.9% NACL USP BTL

## (undated) DEVICE — ENDOCUT SCISSOR TIP, DISPOSABLE: Brand: RENEW